# Patient Record
Sex: MALE | Race: WHITE | Employment: FULL TIME | URBAN - METROPOLITAN AREA
[De-identification: names, ages, dates, MRNs, and addresses within clinical notes are randomized per-mention and may not be internally consistent; named-entity substitution may affect disease eponyms.]

---

## 2018-07-24 ENCOUNTER — HOSPITAL ENCOUNTER (OUTPATIENT)
Dept: PHYSICAL THERAPY | Age: 58
Discharge: HOME | End: 2018-07-24
Attending: FAMILY MEDICINE
Payer: COMMERCIAL

## 2018-07-24 DIAGNOSIS — M77.8 SHOULDER TENDONITIS, RIGHT: ICD-10-CM

## 2018-07-24 PROCEDURE — 97162 PT EVAL MOD COMPLEX 30 MIN: CPT

## 2018-07-24 NOTE — THERAPY EVALUATION
Shannan Watkins  : 1960  Primary: [facility]*  Secondary:  [facility] [place] [address], [address] [facility]  [phone]   [phone]       OUTPATIENT PHYSICAL THERAPY:Initial Assessment and Daily Note 2018   ICD-10: Treatment Diagnosis: pain in joint, right shoulder M25.511  ICD-10: Treatment Diagnosis: shoulder tendonitis, right M75.81  ICD-10: Treatment Diagnosis: pain in thoracic spine M54.6  Precautions/Allergies:   Review of patient's allergies indicates no known allergies. Fall Risk Score: 1 (? 5 = High Risk)  MD Orders: evaluate and treat MEDICAL/REFERRING DIAGNOSIS:  Shoulder tendonitis, right [M75.81]   DATE OF ONSET: one year/chronic  REFERRING PHYSICIAN: Laura Abarca MD  RETURN PHYSICIAN APPOINTMENT: 2019     INITIAL ASSESSMENT:  Mr. Es Washington is a 59 y.o. male who presents to physical therapy with chronic right shoulder pain and limited ROM. He is challenged with shoulder flexion/elevation, external rotation and internal rotation. He presents with significant mechanical and neuromuscular dysfunctions of the right shoulder girdle and thoracic spine. He would benefit from skilled physical therapy to improve overall mobility in his right shoulder girdle, decrease soft tissue restrictions, improve imbalance of muscles and demonstrate pain free ROM with daily activities. PROBLEM LIST (Impacting functional limitations):  1. Decreased Strength  2. Decreased ADL/Functional Activities  3. Decreased Balance  4. Increased Pain  5. Decreased Activity Tolerance  6. Decreased Flexibility/Joint Mobility INTERVENTIONS PLANNED:  1. Home Exercise Program (HEP)  2. Manual Therapy  3. Neuromuscular Re-education/Strengthening  4. Range of Motion (ROM)  5. Therapeutic Activites  6. Therapeutic Exercise/Strengthening   TREATMENT PLAN:  Effective Dates: 2018 TO 10/22/2018 (90 days).   Frequency/Duration: 2 times a week for 90 Days  GOALS: (Goals have been discussed and agreed upon with patient.)  Discharge Goals: Time Frame: 90 days  1. Patient demonstrates independence with his HEP without verbal cueing from therapist.  2. Patient demonstrates full AROM of right shoulder girdle  3. Demonstrates correct sitting posture and desk set up and able to sit for 1 hour with correct posture techniques. 4. Patient able to perform 1 hour of activity with pain no more than 0-2/10 in right shoulder girdle. 5. Improve DASH outcome measure score from 25/55 to 15/55  Rehabilitation Potential For Stated Goals: Excellent  Regarding Pritesh Watkins's therapy, I certify that the treatment plan above will be carried out by a therapist or under their direction. Thank you for this referral,  Emeka Ball PT                 The information in this section was collected on 7/24/18 (except where otherwise noted). HISTORY:   History of Present Injury/Illness (Reason for Referral):  Chronic history of right shoulder pain, started with insidious onset about one year ago and discomfort and popping in shoulder has continued to progress. He is an avid weight  and works out 4-5x a week at [employer], notes he has been lifting weighs since the 80s. Notes he is able to do all activities when his arms are by his sides with no pain or irritation. When he attempts to lift up and overhead as well as reaching out and performing shoulder ER increased pain and discomfort is present. Patient denies dizziness, drop attacks, numbness/tingling, bowel/bladder dysfunction and/or unexplained weight gain/loss. Past Medical History/Comorbidities:   Mr. Cara Delvalle  has a past medical history of CAD (coronary artery disease) (1/24/2014); Cancer ([facility] [address] (06/19/2018); Cervicalgia; Pain in joint, ankle and foot; Pain in joint, shoulder region (1/24/2014);  Routine general medical examination at a health care facility; Screening for lipoid disorders; Special screening for malignant neoplasm of prostate; and Unspecified deformity of finger. Mr. Richi Turner  has a past surgical history that includes hx colonoscopy (2008) and hx heent (06/19/2018). Social History/Living Environment:     Lives in a private home with his wife, challenged with activities with reaching and overhead. Prior Level of Function/Work/Activity:  Works full time as a Technology , notes occasional discomfort in right shoulder, however most discomfort noted with his daily work outs at [employer], limited with how much weight and repetitions. He has been a weight  since the 80s and notes he has needed to significantly decrease the amount of weight he uses. Dominant Side:         RIGHT  Current Medications:       Current Outpatient Prescriptions:     CHARCOAL, by Does Not Apply route., Disp: , Rfl:     OTHER, Indications: Cholrophyll, Disp: , Rfl:     ibuprofen (MOTRIN) 200 mg tablet, Take  by mouth., Disp: , Rfl:     glucosamine (GLUCOSAMINE RELIEF) 1,000 mg tab, Take  by mouth., Disp: , Rfl:     aspirin delayed-release 81 mg tablet, Take  by mouth daily. , Disp: , Rfl:    Date Last Reviewed:  7/24/2018     Number of Personal Factors/Comorbidities that affect the Plan of Care: 1-2: MODERATE COMPLEXITY   EXAMINATION:   Observation/Orthostatic Postural Assessment:           Shoulder symmetry exhibits right elevated compared to left. Shoulder girdles are bilateral protracted, right greater than left. Scapular position is right elevated. Clavicles are right elevated. Soft tissue observations indicates restrictions in right greater than left upper trapezius, levator scapula, pectoralis, latissimus dorsi, subscapularis, infraspinatus and thoracic spine paraspinals. Patient exhibits a neutral cervical lordosis,  increased thoracic kyphosis with convex right type I curve in mid-thoracic spine, and neutral lumbar lordosis. Lower quadrant bony landmarks are left iliac crest elevated slightly. Palpation:  Myofascial assessment indicates restrictions in anterior chest. Muscle length testing levator scapulae with ipsilateral arm abduction is moderate restrictions right. Upper trapezius length is moderate restrictions bilaterally. Pectoralis minor/major and latissimus dorsi length is restricted right greater than left. Scalene muscle length is restricted right greater than left. Recent incision and scar tissue from melanoma removal on right lateral neck. ROM: Gross active cervical spine rotation is 80% available bilaterally. Apley's scratch test for functional ER/IR is right ER to mid-skull posteriorly, IR to iliac crest on right, left WFL. Passive Accessory Motion: Glenohumeral mobility is limited for right inferior glides. Acromioclavicular mobility is moderate restrictions right. Sternoclavicular mobility is moderate restrictions right. Scapulothoracic mobility is poor neuromuscular control of shoulder girdle position, challenged with posterior depression. AROM(PROM) in degrees Right Left   Shoulder flexion 0-140 0-160   Shoulder abduction (palm down) 0-80 0-100   Shoulder extension 0-10 0-20   Shoulder internal rotation (IR)  (measured at 45 degrees of abduction) 0-15 0-30   ER (measured at 90 degrees of abduction) 0-45 0-50     Strength:   Manual Muscle Test (*/5) Right Left   Shoulder flexion 4- 5   Shoulder extension 4 5   Shoulder abduction 4- 5   Shoulder adduction 4 5   Shoulder ER 4- 5   Shoulder IR 4- 5   Upper trapezius 5 5   Middle trapezius 4 4+   Lower trapezius 4 4+   Rhomboids 4 4+   Serratus Anterior 4 4+   Elbow flexion 5 5   Elbow extension 5 5        Special Tests:    Impingement tests performed on the right shoulder:  Lemons-Jose R test: negative. Neer test: negative. Stability tests performed on the right shoulder:  Sulcus sign: negative. Apprehension test: positive. Neurological Screen:  Myotomes: Key muscle strength testing for bilateral UE is intact.   Dermatomes: Sensation testing through bilateral UE for light touch is intact. Reflexes: Biceps (C5), brachioradialis (C6), and triceps (C7) are not tested today. Neural tension tests: Upper limb tension test A is negative for exacerbation of patient's symptoms. Functional Mobility:  Challenged with overhead activities, reaching forward, reaching behind his back. Body Structures Involved:  1. Bones  2. Joints  3. Muscles Body Functions Affected:  1. Sensory/Pain  2. Neuromusculoskeletal  3. Movement Related Activities and Participation Affected:  1. General Tasks and Demands  2. Mobility  3. Self Care  4. Community, Social and [place]   Number of elements (examined above) that affect the Plan of Care: 3: MODERATE COMPLEXITY   CLINICAL PRESENTATION:   Presentation: Evolving clinical presentation with changing clinical characteristics: MODERATE COMPLEXITY   CLINICAL DECISION MAKING:   Outcome Measure: Tool Used: Disabilities of the Arm, Shoulder and Hand (DASH) Questionnaire - Quick Version  Score:  Initial: 25/55  Most Recent: X/55 (Date: -- )   Interpretation of Score: The DASH is designed to measure the activities of daily living in person's with upper extremity dysfunction or pain. Each section is scored on a 1-5 scale, 5 representing the greatest disability. The scores of each section are added together for a total score of 55. Medical Necessity:   · Patient is expected to demonstrate progress in strength, range of motion, balance, coordination and functional technique to increase independence with ADLs, weight lifting techniques, use of his right shoulder girdle without discomfort. .  Reason for Services/Other Comments:  · Patient continues to require skilled intervention due to challenged with use of his right shoulder girdle with daily activities. .   Use of outcome tool(s) and clinical judgement create a POC that gives a: Questionable prediction of patient's progress: MODERATE COMPLEXITY TREATMENT:   (In addition to Assessment/Re-Assessment sessions the following treatments were rendered)  Pre-treatment Symptoms/Complaints:  Patient notes looking forward to therapy to learn correct exercises and figure out what is going on with his shoulder. Pain: Initial:   Pain Intensity 1: 4  Pain Location 1: Shoulder  Pain Orientation 1: Right  Post Session:  3/10     Therapeutic Exercise: (5 Minutes):  Exercises per grid below to improve mobility, strength, balance and coordination. Required minimal verbal and manual cues to promote proper body alignment, promote proper body posture, promote proper body mechanics and promote proper body breathing techniques. Progressed resistance, range, repetitions and complexity of movement as indicated. Date:  7/24/2018     Activity/Exercise Parameters   Review anatomy, prognosis and plan of care for physical therapy X 5 minutes                               Digital [employer] Portal  Treatment/Session Assessment:    · Response to Treatment:  Improved awareness of shoulder anatomy and understands plan of care for physical therapy. · Compliance with Program/Exercises: compliant all of the time. · Recommendations/Intent for next treatment session: \"Next visit will focus on advancements to more challenging activities\".   Total Treatment Duration: 60 minutes: 55 minutes evaluation and 5 minutes treatment  PT Patient Time In/Time Out  Time In: 0463  Time Out: 1530 Marley Hook, PT

## 2018-07-24 NOTE — PROGRESS NOTES
Ambulatory/Rehab Services H2 Model Falls Risk Assessment    Risk Factor Pts. ·   Confusion/Disorientation/Impulsivity  []    4 ·   Symptomatic Depression  []   2 ·   Altered Elimination  []   1 ·   Dizziness/Vertigo  []   1 ·   Gender (Male)  [x]   1 ·   Any administered antiepileptics (anticonvulsants):  []   2 ·   Any administered benzodiazepines:  []   1 ·   Visual Impairment (specify):  []   1 ·   Portable Oxygen Use  []   1 ·   Orthostatic ? BP  []   1 ·   History of Recent Falls (within 3 mos.)  []   5     Ability to Rise from Chair (choose one) Pts. ·   Ability to rise in a single movement  [x]   0 ·   Pushes up, successful in one attempt  []   1 ·   Multiple attempts, but successful  []   3 ·   Unable to rise without assistance  []   4   Total: (5 or greater = High Risk) 1     Falls Prevention Plan:   []                Physical Limitations to Exercise (specify):   []                Mobility Assistance Device (type):   []                Exercise/Equipment Adaptation (specify):    ©2010 [facility] of [address] Patent #[id number].  Federal Law prohibits the replication, distribution or use without written permission from [facility] [employer]

## 2018-07-26 ENCOUNTER — HOSPITAL ENCOUNTER (OUTPATIENT)
Dept: PHYSICAL THERAPY | Age: 58
Discharge: HOME | End: 2018-07-26
Attending: FAMILY MEDICINE
Payer: COMMERCIAL

## 2018-07-26 PROCEDURE — 97140 MANUAL THERAPY 1/> REGIONS: CPT

## 2018-07-26 PROCEDURE — 97110 THERAPEUTIC EXERCISES: CPT

## 2018-07-26 NOTE — PROGRESS NOTES
Juancarlos Renae  : 1960  Primary: Lamberto Campos [facility]*  Secondary:  [facility], [address] [facility]  [phone]   [phone]       OUTPATIENT PHYSICAL THERAPY:Daily Note 2018   ICD-10: Treatment Diagnosis: pain in joint, right shoulder M25.511  ICD-10: Treatment Diagnosis: shoulder tendonitis, right M75.81  ICD-10: Treatment Diagnosis: pain in thoracic spine M54.6  Precautions/Allergies:   Review of patient's allergies indicates no known allergies. Fall Risk Score: 1 (? 5 = High Risk)  MD Orders: evaluate and treat MEDICAL/REFERRING DIAGNOSIS:  Other shoulder lesions, right shoulder [M75.81]   DATE OF ONSET: one year/chronic  REFERRING PHYSICIAN: Oliva Linder MD  RETURN PHYSICIAN APPOINTMENT: 2019     INITIAL ASSESSMENT:  Mr. Harjeet Dugan is a 59 y.o. male who presents to physical therapy with chronic right shoulder pain and limited ROM. He is challenged with shoulder flexion/elevation, external rotation and internal rotation. He presents with significant mechanical and neuromuscular dysfunctions of the right shoulder girdle and thoracic spine. He would benefit from skilled physical therapy to improve overall mobility in his right shoulder girdle, decrease soft tissue restrictions, improve imbalance of muscles and demonstrate pain free ROM with daily activities. PROBLEM LIST (Impacting functional limitations):  1. Decreased Strength  2. Decreased ADL/Functional Activities  3. Decreased Balance  4. Increased Pain  5. Decreased Activity Tolerance  6. Decreased Flexibility/Joint Mobility INTERVENTIONS PLANNED:  1. Home Exercise Program (HEP)  2. Manual Therapy  3. Neuromuscular Re-education/Strengthening  4. Range of Motion (ROM)  5. Therapeutic Activites  6. Therapeutic Exercise/Strengthening   TREATMENT PLAN:  Effective Dates: 2018 TO 10/22/2018 (90 days).   Frequency/Duration: 2 times a week for 90 Days  GOALS: (Goals have been discussed and agreed upon with patient.)  Discharge Goals: Time Frame: 90 days  1. Patient demonstrates independence with his HEP without verbal cueing from therapist.  2. Patient demonstrates full AROM of right shoulder girdle  3. Demonstrates correct sitting posture and desk set up and able to sit for 1 hour with correct posture techniques. 4. Patient able to perform 1 hour of activity with pain no more than 0-2/10 in right shoulder girdle. 5. Improve DASH outcome measure score from 25/55 to 15/55  Rehabilitation Potential For Stated Goals: Excellent  Regarding Pritesh Watkins's therapy, I certify that the treatment plan above will be carried out by a therapist or under their direction. Thank you for this referral,  Sandor Maldonado PT                 The information in this section was collected on 7/24/18 (except where otherwise noted). HISTORY:   History of Present Injury/Illness (Reason for Referral):  Chronic history of right shoulder pain, started with insidious onset about one year ago and discomfort and popping in shoulder has continued to progress. He is an avid weight  and works out 4-5x a week at [employer], notes he has been lifting weighs since the 80s. Notes he is able to do all activities when his arms are by his sides with no pain or irritation. When he attempts to lift up and overhead as well as reaching out and performing shoulder ER increased pain and discomfort is present. Patient denies dizziness, drop attacks, numbness/tingling, bowel/bladder dysfunction and/or unexplained weight gain/loss. Past Medical History/Comorbidities:   Mr. Hernandez Ra  has a past medical history of CAD (coronary artery disease) (1/24/2014); Cancer ([facility] [address] (06/19/2018); Cervicalgia; Pain in joint, ankle and foot; Pain in joint, shoulder region (1/24/2014);  Routine general medical examination at a health care facility; Screening for lipoid disorders; Special screening for malignant neoplasm of prostate; and Unspecified deformity of finger. Mr. Barbara Palafox  has a past surgical history that includes hx colonoscopy (2008) and hx heent (06/19/2018). Social History/Living Environment:     Lives in a private home with his wife, challenged with activities with reaching and overhead. Prior Level of Function/Work/Activity:  Works full time as a Technology , notes occasional discomfort in right shoulder, however most discomfort noted with his daily work outs at [employer], limited with how much weight and repetitions. He has been a weight  since the 80s and notes he has needed to significantly decrease the amount of weight he uses. Dominant Side:         RIGHT  Current Medications:       Current Outpatient Prescriptions:     CHARCOAL, by Does Not Apply route., Disp: , Rfl:     OTHER, Indications: Cholrophyll, Disp: , Rfl:     ibuprofen (MOTRIN) 200 mg tablet, Take  by mouth., Disp: , Rfl:     glucosamine (GLUCOSAMINE RELIEF) 1,000 mg tab, Take  by mouth., Disp: , Rfl:     aspirin delayed-release 81 mg tablet, Take  by mouth daily. , Disp: , Rfl:    Date Last Reviewed:  7/26/2018     Number of Personal Factors/Comorbidities that affect the Plan of Care: 1-2: MODERATE COMPLEXITY   EXAMINATION:   Observation/Orthostatic Postural Assessment:           Shoulder symmetry exhibits right elevated compared to left. Shoulder girdles are bilateral protracted, right greater than left. Scapular position is right elevated. Clavicles are right elevated. Soft tissue observations indicates restrictions in right greater than left upper trapezius, levator scapula, pectoralis, latissimus dorsi, subscapularis, infraspinatus and thoracic spine paraspinals. Patient exhibits a neutral cervical lordosis,  increased thoracic kyphosis with convex right type I curve in mid-thoracic spine, and neutral lumbar lordosis. Lower quadrant bony landmarks are left iliac crest elevated slightly. Palpation:  Myofascial assessment indicates restrictions in anterior chest. Muscle length testing levator scapulae with ipsilateral arm abduction is moderate restrictions right. Upper trapezius length is moderate restrictions bilaterally. Pectoralis minor/major and latissimus dorsi length is restricted right greater than left. Scalene muscle length is restricted right greater than left. Recent incision and scar tissue from melanoma removal on right lateral neck. ROM: Gross active cervical spine rotation is 80% available bilaterally. Apley's scratch test for functional ER/IR is right ER to mid-skull posteriorly, IR to iliac crest on right, left WFL. Passive Accessory Motion: Glenohumeral mobility is limited for right inferior glides. Acromioclavicular mobility is moderate restrictions right. Sternoclavicular mobility is moderate restrictions right. Scapulothoracic mobility is poor neuromuscular control of shoulder girdle position, challenged with posterior depression. AROM(PROM) in degrees Right Left   Shoulder flexion 0-140 0-160   Shoulder abduction (palm down) 0-80 0-100   Shoulder extension 0-10 0-20   Shoulder internal rotation (IR)  (measured at 45 degrees of abduction) 0-15 0-30   ER (measured at 90 degrees of abduction) 0-45 0-50     Strength:   Manual Muscle Test (*/5) Right Left   Shoulder flexion 4- 5   Shoulder extension 4 5   Shoulder abduction 4- 5   Shoulder adduction 4 5   Shoulder ER 4- 5   Shoulder IR 4- 5   Upper trapezius 5 5   Middle trapezius 4 4+   Lower trapezius 4 4+   Rhomboids 4 4+   Serratus Anterior 4 4+   Elbow flexion 5 5   Elbow extension 5 5        Special Tests:    Impingement tests performed on the right shoulder:  Lemons-Jose R test: negative. Neer test: negative. Stability tests performed on the right shoulder:  Sulcus sign: negative. Apprehension test: positive. Neurological Screen:  Myotomes: Key muscle strength testing for bilateral UE is intact.   Dermatomes: Sensation testing through bilateral UE for light touch is intact. Reflexes: Biceps (C5), brachioradialis (C6), and triceps (C7) are not tested today. Neural tension tests: Upper limb tension test A is negative for exacerbation of patient's symptoms. Functional Mobility:  Challenged with overhead activities, reaching forward, reaching behind his back. Body Structures Involved:  1. Bones  2. Joints  3. Muscles Body Functions Affected:  1. Sensory/Pain  2. Neuromusculoskeletal  3. Movement Related Activities and Participation Affected:  1. General Tasks and Demands  2. Mobility  3. Self Care  4. Community, Social and [place]   Number of elements (examined above) that affect the Plan of Care: 3: MODERATE COMPLEXITY   CLINICAL PRESENTATION:   Presentation: Evolving clinical presentation with changing clinical characteristics: MODERATE COMPLEXITY   CLINICAL DECISION MAKING:   Outcome Measure: Tool Used: Disabilities of the Arm, Shoulder and Hand (DASH) Questionnaire - Quick Version  Score:  Initial: 25/55  Most Recent: X/55 (Date: -- )   Interpretation of Score: The DASH is designed to measure the activities of daily living in person's with upper extremity dysfunction or pain. Each section is scored on a 1-5 scale, 5 representing the greatest disability. The scores of each section are added together for a total score of 55. Medical Necessity:   · Patient is expected to demonstrate progress in strength, range of motion, balance, coordination and functional technique to increase independence with ADLs, weight lifting techniques, use of his right shoulder girdle without discomfort. .  Reason for Services/Other Comments:  · Patient continues to require skilled intervention due to challenged with use of his right shoulder girdle with daily activities. .   Use of outcome tool(s) and clinical judgement create a POC that gives a: Questionable prediction of patient's progress: MODERATE COMPLEXITY TREATMENT:   (In addition to Assessment/Re-Assessment sessions the following treatments were rendered)  Pre-treatment Symptoms/Complaints:  Patient notes trying to be more aware of his posture, notes a difference already in his shoulder pain, less sharp pain. .   Pain: Initial:   Pain Intensity 1: 3  Pain Location 1: Shoulder  Pain Orientation 1: Right  Post Session:  3/10     Therapeutic Exercise: (25 Minutes):  Exercises per grid below to improve mobility, strength, balance and coordination. Required minimal verbal and manual cues to promote proper body alignment, promote proper body posture, promote proper body mechanics and promote proper body breathing techniques. Progressed resistance, range, repetitions and complexity of movement as indicated. Date:  7/26/2018     Activity/Exercise Parameters   Review posture X 5 minutes, patient tends to present with elevated sternum/thoracic spine extension posture   Supine serratus punch X 15 reps, 10 sec holds RUE  X 2 minute hold   Supine abdominal brace X 5 minute education core activation   x 5 reps, 30 sec holds   Patient education X 5 minute education of phasic and tonic spread, weight training techniques and review of current exercises at gym. Manual Therapy (    Soft Tissue Mobilization Duration  Duration: 35 Minutes): Manual techniques to facilitate improved motion and decreased pain.  (Used abbreviations: MET - muscle energy technique; PNF - proprioceptive neuromuscular facilitation; NMR - neuromuscular re-education; a/p - anterior to posterior; p/a - posterior to anterior, FMP - functional movement pattern)   · Supine anterior chest soft tissue mobilization with breathing techniques  · Supine right pectoralis major/minor soft tissue mobilization  · Supine right subscapularis and latissimus dorsi soft tissue mobilization with FMP of shoulder elevation      Med[employer] Portal  Treatment/Session Assessment:    · Response to Treatment: Challenged with core activation and continues to present with hyper extended posture. Fatigued easily with serratus anterior activation. · Compliance with Program/Exercises: compliant all of the time. · Recommendations/Intent for next treatment session: \"Next visit will focus on advancements to more challenging activities\".   Total Treatment Duration: 60 minutes  PT Patient Time In/Time Out  Time In: 4523  Time Out: Dominic Hardy, PT

## 2018-08-01 ENCOUNTER — HOSPITAL ENCOUNTER (OUTPATIENT)
Dept: PHYSICAL THERAPY | Age: 58
Discharge: HOME | End: 2018-08-01
Attending: FAMILY MEDICINE
Payer: COMMERCIAL

## 2018-08-01 PROCEDURE — 97140 MANUAL THERAPY 1/> REGIONS: CPT

## 2018-08-01 PROCEDURE — 97110 THERAPEUTIC EXERCISES: CPT

## 2018-08-01 NOTE — PROGRESS NOTES
Chaz Watkins  : 1960  Primary: [facility]  Secondary:  Therapy Center at [address]  [phone]   [phone]       OUTPATIENT PHYSICAL THERAPY:Daily Note 2018   ICD-10: Treatment Diagnosis: pain in joint, right shoulder M25.511  ICD-10: Treatment Diagnosis: shoulder tendonitis, right M75.81  ICD-10: Treatment Diagnosis: pain in thoracic spine M54.6  Precautions/Allergies:   Review of patient's allergies indicates no known allergies. Fall Risk Score: 1 (? 5 = High Risk)  MD Orders: evaluate and treat MEDICAL/REFERRING DIAGNOSIS:  Other shoulder lesions, right shoulder [M75.81]   DATE OF ONSET: one year/chronic  REFERRING PHYSICIAN: Ivan Mcqueen MD  RETURN PHYSICIAN APPOINTMENT: 2019     INITIAL ASSESSMENT:  Mr. Barbara Palafox is a 59 y.o. male who presents to physical therapy with chronic right shoulder pain and limited ROM. He is challenged with shoulder flexion/elevation, external rotation and internal rotation. He presents with significant mechanical and neuromuscular dysfunctions of the right shoulder girdle and thoracic spine. He would benefit from skilled physical therapy to improve overall mobility in his right shoulder girdle, decrease soft tissue restrictions, improve imbalance of muscles and demonstrate pain free ROM with daily activities. PROBLEM LIST (Impacting functional limitations):  1. Decreased Strength  2. Decreased ADL/Functional Activities  3. Decreased Balance  4. Increased Pain  5. Decreased Activity Tolerance  6. Decreased Flexibility/Joint Mobility INTERVENTIONS PLANNED:  1. Home Exercise Program (HEP)  2. Manual Therapy  3. Neuromuscular Re-education/Strengthening  4. Range of Motion (ROM)  5. Therapeutic Activites  6. Therapeutic Exercise/Strengthening   TREATMENT PLAN:  Effective Dates: 2018 TO 10/22/2018 (90 days).   Frequency/Duration: 2 times a week for 90 Days  GOALS: (Goals have been discussed and agreed upon with patient.)  Discharge Goals: Time Frame: 90 days  1. Patient demonstrates independence with his HEP without verbal cueing from therapist.  2. Patient demonstrates full AROM of right shoulder girdle  3. Demonstrates correct sitting posture and desk set up and able to sit for 1 hour with correct posture techniques. 4. Patient able to perform 1 hour of activity with pain no more than 0-2/10 in right shoulder girdle. 5. Improve DASH outcome measure score from 25/55 to 15/55  Rehabilitation Potential For Stated Goals: Excellent  Regarding Pritesh Watkins's therapy, I certify that the treatment plan above will be carried out by a therapist or under their direction. Thank you for this referral,  Libra Brunson PT                 The information in this section was collected on 7/24/18 (except where otherwise noted). HISTORY:   History of Present Injury/Illness (Reason for Referral):  Chronic history of right shoulder pain, started with insidious onset about one year ago and discomfort and popping in shoulder has continued to progress. He is an avid weight  and works out 4-5x a week at [employer], notes he has been lifting weighs since the 80s. Notes he is able to do all activities when his arms are by his sides with no pain or irritation. When he attempts to lift up and overhead as well as reaching out and performing shoulder ER increased pain and discomfort is present. Patient denies dizziness, drop attacks, numbness/tingling, bowel/bladder dysfunction and/or unexplained weight gain/loss. Past Medical History/Comorbidities:   Mr. Harjeet Dugan  has a past medical history of CAD (coronary artery disease) (1/24/2014); Cancer ([facility] [address] (06/19/2018); Cervicalgia; Pain in joint, ankle and foot; Pain in joint, shoulder region (1/24/2014);  Routine general medical examination at a health care facility; Screening for lipoid disorders; Special screening for malignant neoplasm of prostate; and Unspecified deformity of finger. Mr. Melissa Priest  has a past surgical history that includes hx colonoscopy (2008) and hx heent (06/19/2018). Social History/Living Environment:     Lives in a private home with his wife, challenged with activities with reaching and overhead. Prior Level of Function/Work/Activity:  Works full time as a Technology , notes occasional discomfort in right shoulder, however most discomfort noted with his daily work outs at [employer], limited with how much weight and repetitions. He has been a weight  since the 80s and notes he has needed to significantly decrease the amount of weight he uses. Dominant Side:         RIGHT  Current Medications:       Current Outpatient Prescriptions:     CHARCOAL, by Does Not Apply route., Disp: , Rfl:     OTHER, Indications: Cholrophyll, Disp: , Rfl:     ibuprofen (MOTRIN) 200 mg tablet, Take  by mouth., Disp: , Rfl:     glucosamine (GLUCOSAMINE RELIEF) 1,000 mg tab, Take  by mouth., Disp: , Rfl:     aspirin delayed-release 81 mg tablet, Take  by mouth daily. , Disp: , Rfl:    Date Last Reviewed:  8/1/2018     Number of Personal Factors/Comorbidities that affect the Plan of Care: 1-2: MODERATE COMPLEXITY   EXAMINATION:   Observation/Orthostatic Postural Assessment:           Shoulder symmetry exhibits right elevated compared to left. Shoulder girdles are bilateral protracted, right greater than left. Scapular position is right elevated. Clavicles are right elevated. Soft tissue observations indicates restrictions in right greater than left upper trapezius, levator scapula, pectoralis, latissimus dorsi, subscapularis, infraspinatus and thoracic spine paraspinals. Patient exhibits a neutral cervical lordosis,  increased thoracic kyphosis with convex right type I curve in mid-thoracic spine, and neutral lumbar lordosis. Lower quadrant bony landmarks are left iliac crest elevated slightly.     Palpation: Myofascial assessment indicates restrictions in anterior chest. Muscle length testing levator scapulae with ipsilateral arm abduction is moderate restrictions right. Upper trapezius length is moderate restrictions bilaterally. Pectoralis minor/major and latissimus dorsi length is restricted right greater than left. Scalene muscle length is restricted right greater than left. Recent incision and scar tissue from melanoma removal on right lateral neck. ROM: Gross active cervical spine rotation is 80% available bilaterally. Apley's scratch test for functional ER/IR is right ER to mid-skull posteriorly, IR to iliac crest on right, left WFL. Passive Accessory Motion: Glenohumeral mobility is limited for right inferior glides. Acromioclavicular mobility is moderate restrictions right. Sternoclavicular mobility is moderate restrictions right. Scapulothoracic mobility is poor neuromuscular control of shoulder girdle position, challenged with posterior depression. AROM(PROM) in degrees Right Left   Shoulder flexion 0-140 0-160   Shoulder abduction (palm down) 0-80 0-100   Shoulder extension 0-10 0-20   Shoulder internal rotation (IR)  (measured at 45 degrees of abduction) 0-15 0-30   ER (measured at 90 degrees of abduction) 0-45 0-50     Strength:   Manual Muscle Test (*/5) Right Left   Shoulder flexion 4- 5   Shoulder extension 4 5   Shoulder abduction 4- 5   Shoulder adduction 4 5   Shoulder ER 4- 5   Shoulder IR 4- 5   Upper trapezius 5 5   Middle trapezius 4 4+   Lower trapezius 4 4+   Rhomboids 4 4+   Serratus Anterior 4 4+   Elbow flexion 5 5   Elbow extension 5 5        Special Tests:    Impingement tests performed on the right shoulder:  Lemons-Jose R test: negative. Neer test: negative. Stability tests performed on the right shoulder:  Sulcus sign: negative. Apprehension test: positive. Neurological Screen:  Myotomes: Key muscle strength testing for bilateral UE is intact.   Dermatomes: Sensation testing through bilateral UE for light touch is intact. Reflexes: Biceps (C5), brachioradialis (C6), and triceps (C7) are not tested today. Neural tension tests: Upper limb tension test A is negative for exacerbation of patient's symptoms. Functional Mobility:  Challenged with overhead activities, reaching forward, reaching behind his back. Body Structures Involved:  1. Bones  2. Joints  3. Muscles Body Functions Affected:  1. Sensory/Pain  2. Neuromusculoskeletal  3. Movement Related Activities and Participation Affected:  1. General Tasks and Demands  2. Mobility  3. Self Care  4. Community, Social and [place]   Number of elements (examined above) that affect the Plan of Care: 3: MODERATE COMPLEXITY   CLINICAL PRESENTATION:   Presentation: Evolving clinical presentation with changing clinical characteristics: MODERATE COMPLEXITY   CLINICAL DECISION MAKING:   Outcome Measure: Tool Used: Disabilities of the Arm, Shoulder and Hand (DASH) Questionnaire - Quick Version  Score:  Initial: 25/55  Most Recent: X/55 (Date: -- )   Interpretation of Score: The DASH is designed to measure the activities of daily living in person's with upper extremity dysfunction or pain. Each section is scored on a 1-5 scale, 5 representing the greatest disability. The scores of each section are added together for a total score of 55. Medical Necessity:   · Patient is expected to demonstrate progress in strength, range of motion, balance, coordination and functional technique to increase independence with ADLs, weight lifting techniques, use of his right shoulder girdle without discomfort. .  Reason for Services/Other Comments:  · Patient continues to require skilled intervention due to challenged with use of his right shoulder girdle with daily activities. .   Use of outcome tool(s) and clinical judgement create a POC that gives a: Questionable prediction of patient's progress: MODERATE COMPLEXITY            TREATMENT: (In addition to Assessment/Re-Assessment sessions the following treatments were rendered)  Pre-treatment Symptoms/Complaints:  Patient notes less pain with daily movements in his right shoulder, however continues to have stiffness especially after stationary positions. Pain: Initial:   Pain Intensity 1: 3  Pain Location 1: Shoulder  Pain Orientation 1: Right  Post Session:  3/10     Therapeutic Exercise: (30 Minutes):  Exercises per grid below to improve mobility, strength, balance and coordination. Required minimal verbal and manual cues to promote proper body alignment, promote proper body posture, promote proper body mechanics and promote proper body breathing techniques. Progressed resistance, range, repetitions and complexity of movement as indicated. Date:  8/1/2018     Activity/Exercise Parameters   Review posture X 5 minutes, patient tends to present with elevated sternum/thoracic spine extension posture   Supine serratus punch X 15 reps, 10 sec holds RUE  X 2 minute hold   Supine abdominal brace  x 5 reps, 30 sec holds   Patient education     Seated scapular retraction X 5 minute education of postural positions   X 10 reps 10 sec holds  X 10 reps, 10 sec  Holds with green band   Seated shoulder elevation with inferior glides X 10 reps, green t-band   Supine shoulder ER X 15 reps, red t-band at 90 degrees abduction     Manual Therapy (    Soft Tissue Mobilization Duration  Duration: 30 Minutes): Manual techniques to facilitate improved motion and decreased pain.  (Used abbreviations: MET - muscle energy technique; PNF - proprioceptive neuromuscular facilitation; NMR - neuromuscular re-education; a/p - anterior to posterior; p/a - posterior to anterior, FMP - functional movement pattern)   · Supine anterior chest soft tissue mobilization with breathing techniques  · Supine right pectoralis major/minor soft tissue mobilization  · Supine right subscapularis and latissimus dorsi soft tissue mobilization with FMP of shoulder elevation  · Supine right GH joint mobilizations, inferior glides  · Supine right GH joint PROM into ER at 90 degrees abduction, followed with prolonged holds and NMR       MedBridge Portal  Treatment/Session Assessment:    · Response to Treatment:  Challenged with posture and prolonged holds with exercises, inhibition noted through right shoulder with shoulder ER exercises . · Compliance with Program/Exercises: compliant all of the time. · Recommendations/Intent for next treatment session: \"Next visit will focus on advancements to more challenging activities\".   Total Treatment Duration: 60 minutes  PT Patient Time In/Time Out  Time In: 7010  Time Out: 0862 [address] [place]

## 2018-08-06 ENCOUNTER — HOSPITAL ENCOUNTER (OUTPATIENT)
Dept: PHYSICAL THERAPY | Age: 58
Discharge: HOME | End: 2018-08-06
Attending: FAMILY MEDICINE
Payer: COMMERCIAL

## 2018-08-06 NOTE — PROGRESS NOTES
Trudy Snede   (YTW:[id number]) [address]  at  [place] [address] [address]  [phone]  [phone]             DATE: 8/6/2018    Patient canceled for appointment today due to work conflict. Will plan to follow up on next scheduled visit.     Scot Cleaning PT, DPT, CFMT

## 2018-08-08 ENCOUNTER — HOSPITAL ENCOUNTER (OUTPATIENT)
Dept: PHYSICAL THERAPY | Age: 58
Discharge: HOME | End: 2018-08-08
Attending: FAMILY MEDICINE
Payer: COMMERCIAL

## 2018-08-08 PROCEDURE — 97110 THERAPEUTIC EXERCISES: CPT

## 2018-08-08 PROCEDURE — 97140 MANUAL THERAPY 1/> REGIONS: CPT

## 2018-08-08 NOTE — PROGRESS NOTES
Harvinder Watkins  : 1960  Primary: [facility]  Secondary:  [facility] at [address]  [phone]   [phone]       OUTPATIENT PHYSICAL THERAPY:Daily Note 2018   ICD-10: Treatment Diagnosis: pain in joint, right shoulder M25.511  ICD-10: Treatment Diagnosis: shoulder tendonitis, right M75.81  ICD-10: Treatment Diagnosis: pain in thoracic spine M54.6  Precautions/Allergies:   Review of patient's allergies indicates no known allergies. Fall Risk Score: 1 (? 5 = High Risk)  MD Orders: evaluate and treat MEDICAL/REFERRING DIAGNOSIS:  Other shoulder lesions, right shoulder [M75.81]   DATE OF ONSET: one year/chronic  REFERRING PHYSICIAN: Linda Moreno MD  RETURN PHYSICIAN APPOINTMENT: 2019     INITIAL ASSESSMENT:  Mr. Alfredo Gallegos is a 59 y.o. male who presents to physical therapy with chronic right shoulder pain and limited ROM. He is challenged with shoulder flexion/elevation, external rotation and internal rotation. He presents with significant mechanical and neuromuscular dysfunctions of the right shoulder girdle and thoracic spine. He would benefit from skilled physical therapy to improve overall mobility in his right shoulder girdle, decrease soft tissue restrictions, improve imbalance of muscles and demonstrate pain free ROM with daily activities. PROBLEM LIST (Impacting functional limitations):  1. Decreased Strength  2. Decreased ADL/Functional Activities  3. Decreased Balance  4. Increased Pain  5. Decreased Activity Tolerance  6. Decreased Flexibility/Joint Mobility INTERVENTIONS PLANNED:  1. Home Exercise Program (HEP)  2. Manual Therapy  3. Neuromuscular Re-education/Strengthening  4. Range of Motion (ROM)  5. Therapeutic Activites  6. Therapeutic Exercise/Strengthening   TREATMENT PLAN:  Effective Dates: 2018 TO 10/22/2018 (90 days).   Frequency/Duration: 2 times a week for 90 Days  GOALS: (Goals have been discussed and agreed upon with patient.)  Discharge Goals: Time Frame: 90 days  1. Patient demonstrates independence with his HEP without verbal cueing from therapist.  2. Patient demonstrates full AROM of right shoulder girdle  3. Demonstrates correct sitting posture and desk set up and able to sit for 1 hour with correct posture techniques. 4. Patient able to perform 1 hour of activity with pain no more than 0-2/10 in right shoulder girdle. 5. Improve DASH outcome measure score from 25/55 to 15/55  Rehabilitation Potential For Stated Goals: Excellent  Regarding Pritesh Watkins's therapy, I certify that the treatment plan above will be carried out by a therapist or under their direction. Thank you for this referral,  Indy Saldana PT                 The information in this section was collected on 7/24/18 (except where otherwise noted). HISTORY:   History of Present Injury/Illness (Reason for Referral):  Chronic history of right shoulder pain, started with insidious onset about one year ago and discomfort and popping in shoulder has continued to progress. He is an avid weight  and works out 4-5x a week at [employer], notes he has been lifting weighs since the 80s. Notes he is able to do all activities when his arms are by his sides with no pain or irritation. When he attempts to lift up and overhead as well as reaching out and performing shoulder ER increased pain and discomfort is present. Patient denies dizziness, drop attacks, numbness/tingling, bowel/bladder dysfunction and/or unexplained weight gain/loss. Past Medical History/Comorbidities:   Mr. Berta Moe  has a past medical history of CAD (coronary artery disease) (1/24/2014); Cancer ([facility] [address] (06/19/2018); Cervicalgia; Pain in joint, ankle and foot; Pain in joint, shoulder region (1/24/2014);  Routine general medical examination at a health care facility; Screening for lipoid disorders; Special screening for malignant neoplasm of prostate; and Unspecified deformity of finger. Mr. Harjeet Dugan  has a past surgical history that includes hx colonoscopy (2008) and hx heent (06/19/2018). Social History/Living Environment:     Lives in a private home with his wife, challenged with activities with reaching and overhead. Prior Level of Function/Work/Activity:  Works full time as a Technology , notes occasional discomfort in right shoulder, however most discomfort noted with his daily work outs at [employer], limited with how much weight and repetitions. He has been a weight  since the 80s and notes he has needed to significantly decrease the amount of weight he uses. Dominant Side:         RIGHT  Current Medications:       Current Outpatient Prescriptions:     CHARCOAL, by Does Not Apply route., Disp: , Rfl:     OTHER, Indications: Cholrophyll, Disp: , Rfl:     ibuprofen (MOTRIN) 200 mg tablet, Take  by mouth., Disp: , Rfl:     glucosamine (GLUCOSAMINE RELIEF) 1,000 mg tab, Take  by mouth., Disp: , Rfl:     aspirin delayed-release 81 mg tablet, Take  by mouth daily. , Disp: , Rfl:    Date Last Reviewed:  8/8/2018     Number of Personal Factors/Comorbidities that affect the Plan of Care: 1-2: MODERATE COMPLEXITY   EXAMINATION:   Observation/Orthostatic Postural Assessment:           Shoulder symmetry exhibits right elevated compared to left. Shoulder girdles are bilateral protracted, right greater than left. Scapular position is right elevated. Clavicles are right elevated. Soft tissue observations indicates restrictions in right greater than left upper trapezius, levator scapula, pectoralis, latissimus dorsi, subscapularis, infraspinatus and thoracic spine paraspinals. Patient exhibits a neutral cervical lordosis,  increased thoracic kyphosis with convex right type I curve in mid-thoracic spine, and neutral lumbar lordosis. Lower quadrant bony landmarks are left iliac crest elevated slightly.     Palpation: Myofascial assessment indicates restrictions in anterior chest. Muscle length testing levator scapulae with ipsilateral arm abduction is moderate restrictions right. Upper trapezius length is moderate restrictions bilaterally. Pectoralis minor/major and latissimus dorsi length is restricted right greater than left. Scalene muscle length is restricted right greater than left. Recent incision and scar tissue from melanoma removal on right lateral neck. ROM: Gross active cervical spine rotation is 80% available bilaterally. Apley's scratch test for functional ER/IR is right ER to mid-skull posteriorly, IR to iliac crest on right, left WFL. Passive Accessory Motion: Glenohumeral mobility is limited for right inferior glides. Acromioclavicular mobility is moderate restrictions right. Sternoclavicular mobility is moderate restrictions right. Scapulothoracic mobility is poor neuromuscular control of shoulder girdle position, challenged with posterior depression. AROM(PROM) in degrees Right Left   Shoulder flexion 0-140 0-160   Shoulder abduction (palm down) 0-80 0-100   Shoulder extension 0-10 0-20   Shoulder internal rotation (IR)  (measured at 45 degrees of abduction) 0-15 0-30   ER (measured at 90 degrees of abduction) 0-45 0-50     Strength:   Manual Muscle Test (*/5) Right Left   Shoulder flexion 4- 5   Shoulder extension 4 5   Shoulder abduction 4- 5   Shoulder adduction 4 5   Shoulder ER 4- 5   Shoulder IR 4- 5   Upper trapezius 5 5   Middle trapezius 4 4+   Lower trapezius 4 4+   Rhomboids 4 4+   Serratus Anterior 4 4+   Elbow flexion 5 5   Elbow extension 5 5        Special Tests:    Impingement tests performed on the right shoulder:  Lemons-Jose R test: negative. Neer test: negative. Stability tests performed on the right shoulder:  Sulcus sign: negative. Apprehension test: positive. Neurological Screen:  Myotomes: Key muscle strength testing for bilateral UE is intact.   Dermatomes: Sensation testing through bilateral UE for light touch is intact. Reflexes: Biceps (C5), brachioradialis (C6), and triceps (C7) are not tested today. Neural tension tests: Upper limb tension test A is negative for exacerbation of patient's symptoms. Functional Mobility:  Challenged with overhead activities, reaching forward, reaching behind his back. Body Structures Involved:  1. Bones  2. Joints  3. Muscles Body Functions Affected:  1. Sensory/Pain  2. Neuromusculoskeletal  3. Movement Related Activities and Participation Affected:  1. General Tasks and Demands  2. Mobility  3. Self Care  4. Community, Social and [place]   Number of elements (examined above) that affect the Plan of Care: 3: MODERATE COMPLEXITY   CLINICAL PRESENTATION:   Presentation: Evolving clinical presentation with changing clinical characteristics: MODERATE COMPLEXITY   CLINICAL DECISION MAKING:   Outcome Measure: Tool Used: Disabilities of the Arm, Shoulder and Hand (DASH) Questionnaire - Quick Version  Score:  Initial: 25/55  Most Recent: X/55 (Date: -- )   Interpretation of Score: The DASH is designed to measure the activities of daily living in person's with upper extremity dysfunction or pain. Each section is scored on a 1-5 scale, 5 representing the greatest disability. The scores of each section are added together for a total score of 55. Medical Necessity:   · Patient is expected to demonstrate progress in strength, range of motion, balance, coordination and functional technique to increase independence with ADLs, weight lifting techniques, use of his right shoulder girdle without discomfort. .  Reason for Services/Other Comments:  · Patient continues to require skilled intervention due to challenged with use of his right shoulder girdle with daily activities. .   Use of outcome tool(s) and clinical judgement create a POC that gives a: Questionable prediction of patient's progress: MODERATE COMPLEXITY            TREATMENT: (In addition to Assessment/Re-Assessment sessions the following treatments were rendered)  Pre-treatment Symptoms/Complaints:  Patient notes increased pain in right shoulder after Monday's work day, noted he was lifting, reaching and using his RUE more and increased irritation noted since then. Pain: Initial:   Pain Intensity 1: 4  Pain Location 1: Shoulder  Pain Orientation 1: Right  Post Session:  2/10     Therapeutic Exercise: (30 Minutes):  Exercises per grid below to improve mobility, strength, balance and coordination. Required minimal verbal and manual cues to promote proper body alignment, promote proper body posture, promote proper body mechanics and promote proper body breathing techniques. Progressed resistance, range, repetitions and complexity of movement as indicated. Date:  8/8/2018     Activity/Exercise Parameters   Review posture X 5 minutes, patient tends to present with elevated sternum/thoracic spine extension posture   Supine serratus punch X 15 reps, 10 sec holds RUE  X 2 minute hold   Supine abdominal brace  x 5 reps, 30 sec holds   Patient education     Seated scapular retraction  X 10 reps 10 sec holds  X 10 reps, 10 sec  Holds with green band   Seated shoulder elevation with inferior glides X 10 reps, green t-band   Supine shoulder ER X 15 reps, red t-band at 90 degrees abduction   Side lying shoulder arm swings X 10 reps clockwise, counter clockwise   Prone T, I X 15 reps, 5 sec  holds         Manual Therapy (    Soft Tissue Mobilization Duration  Duration: 30 Minutes): Manual techniques to facilitate improved motion and decreased pain.  (Used abbreviations: MET - muscle energy technique; PNF - proprioceptive neuromuscular facilitation; NMR - neuromuscular re-education; a/p - anterior to posterior; p/a - posterior to anterior, FMP - functional movement pattern)   · Supine anterior chest soft tissue mobilization with breathing techniques  · Supine right pectoralis major/minor soft tissue mobilization  · Supine right subscapularis and latissimus dorsi soft tissue mobilization with FMP of shoulder elevation  · Supine right GH joint mobilizations, inferior glides  · Supine right GH joint PROM into ER at 90 degrees abduction, followed with prolonged holds and NMR   · Side lying left for right PNF scapular patterns with prolonged holds into posterior depression. [facility] Portal  Treatment/Session Assessment:    · Response to Treatment:  Improving overall mobility in right shoulder, fatigued easily with postural stabilizer exercises. · Compliance with Program/Exercises: compliant all of the time. · Recommendations/Intent for next treatment session: \"Next visit will focus on advancements to more challenging activities\".   Total Treatment Duration: 60 minutes  PT Patient Time In/Time Out  Time In: 8501  Time Out: 4682 [address] Claudette List, PT

## 2018-08-15 ENCOUNTER — HOSPITAL ENCOUNTER (OUTPATIENT)
Dept: PHYSICAL THERAPY | Age: 58
Discharge: HOME | End: 2018-08-15
Attending: FAMILY MEDICINE
Payer: COMMERCIAL

## 2018-08-15 PROCEDURE — 97110 THERAPEUTIC EXERCISES: CPT

## 2018-08-15 PROCEDURE — 97140 MANUAL THERAPY 1/> REGIONS: CPT

## 2018-08-15 NOTE — PROGRESS NOTES
Nan Sky  : 1960  Primary: [facility]  Secondary:  [facility] at [address], [address] [place] [address]  [phone]   [phone]       OUTPATIENT PHYSICAL THERAPY:Daily Note 8/15/2018   ICD-10: Treatment Diagnosis: pain in joint, right shoulder M25.511  ICD-10: Treatment Diagnosis: shoulder tendonitis, right M75.81  ICD-10: Treatment Diagnosis: pain in thoracic spine M54.6  Precautions/Allergies:   Review of patient's allergies indicates no known allergies. Fall Risk Score: 1 (? 5 = High Risk)  MD Orders: evaluate and treat MEDICAL/REFERRING DIAGNOSIS:  Other shoulder lesions, right shoulder [M75.81]   DATE OF ONSET: one year/chronic  REFERRING PHYSICIAN: Rich Fernandes MD  RETURN PHYSICIAN APPOINTMENT: 2019     INITIAL ASSESSMENT:  Mr. Maria T Wiley is a 59 y.o. male who presents to physical therapy with chronic right shoulder pain and limited ROM. He is challenged with shoulder flexion/elevation, external rotation and internal rotation. He presents with significant mechanical and neuromuscular dysfunctions of the right shoulder girdle and thoracic spine. He would benefit from skilled physical therapy to improve overall mobility in his right shoulder girdle, decrease soft tissue restrictions, improve imbalance of muscles and demonstrate pain free ROM with daily activities. PROBLEM LIST (Impacting functional limitations):  1. Decreased Strength  2. Decreased ADL/Functional Activities  3. Decreased Balance  4. Increased Pain  5. Decreased Activity Tolerance  6. Decreased Flexibility/Joint Mobility INTERVENTIONS PLANNED:  1. Home Exercise Program (HEP)  2. Manual Therapy  3. Neuromuscular Re-education/Strengthening  4. Range of Motion (ROM)  5. Therapeutic Activites  6. Therapeutic Exercise/Strengthening   TREATMENT PLAN:  Effective Dates: 2018 TO 10/22/2018 (90 days).   Frequency/Duration: 2 times a week for 90 Days  GOALS: (Goals have been discussed and agreed upon with patient.)  Discharge Goals: Time Frame: 90 days  1. Patient demonstrates independence with his HEP without verbal cueing from therapist.  2. Patient demonstrates full AROM of right shoulder girdle  3. Demonstrates correct sitting posture and desk set up and able to sit for 1 hour with correct posture techniques. 4. Patient able to perform 1 hour of activity with pain no more than 0-2/10 in right shoulder girdle. 5. Improve DASH outcome measure score from 25/55 to 15/55  Rehabilitation Potential For Stated Goals: Excellent  Regarding Pritesh [place]'s therapy, I certify that the treatment plan above will be carried out by a therapist or under their direction. Thank you for this referral,  Kevin Anand PT                 The information in this section was collected on 7/24/18 (except where otherwise noted). HISTORY:   History of Present Injury/Illness (Reason for Referral):  Chronic history of right shoulder pain, started with insidious onset about one year ago and discomfort and popping in shoulder has continued to progress. He is an avid weight  and works out 4-5x a week at [employer], notes he has been lifting weighs since the 80s. Notes he is able to do all activities when his arms are by his sides with no pain or irritation. When he attempts to lift up and overhead as well as reaching out and performing shoulder ER increased pain and discomfort is present. Patient denies dizziness, drop attacks, numbness/tingling, bowel/bladder dysfunction and/or unexplained weight gain/loss. Past Medical History/Comorbidities:   Mr. Maria T Wiley  has a past medical history of CAD (coronary artery disease) (1/24/2014); Cancer ([facility] [address] (06/19/2018); Cervicalgia; Pain in joint, ankle and foot; Pain in joint, shoulder region (1/24/2014);  Routine general medical examination at a health care facility; Screening for lipoid disorders; Special screening for malignant neoplasm of prostate; and Unspecified deformity of finger. Mr. Bartolo Jimenez  has a past surgical history that includes hx colonoscopy (2008) and hx heent (06/19/2018). Social History/Living Environment:     Lives in a private home with his wife, challenged with activities with reaching and overhead. Prior Level of Function/Work/Activity:  Works full time as a Technology , notes occasional discomfort in right shoulder, however most discomfort noted with his daily work outs at [employer], limited with how much weight and repetitions. He has been a weight  since the 80s and notes he has needed to significantly decrease the amount of weight he uses. Dominant Side:         RIGHT  Current Medications:       Current Outpatient Prescriptions:     CHARCOAL, by Does Not Apply route., Disp: , Rfl:     OTHER, Indications: Cholrophyll, Disp: , Rfl:     ibuprofen (MOTRIN) 200 mg tablet, Take  by mouth., Disp: , Rfl:     glucosamine (GLUCOSAMINE RELIEF) 1,000 mg tab, Take  by mouth., Disp: , Rfl:     aspirin delayed-release 81 mg tablet, Take  by mouth daily. , Disp: , Rfl:    Date Last Reviewed:  8/15/2018     Number of Personal Factors/Comorbidities that affect the Plan of Care: 1-2: MODERATE COMPLEXITY   EXAMINATION:   Observation/Orthostatic Postural Assessment:           Shoulder symmetry exhibits right elevated compared to left. Shoulder girdles are bilateral protracted, right greater than left. Scapular position is right elevated. Clavicles are right elevated. Soft tissue observations indicates restrictions in right greater than left upper trapezius, levator scapula, pectoralis, latissimus dorsi, subscapularis, infraspinatus and thoracic spine paraspinals. Patient exhibits a neutral cervical lordosis,  increased thoracic kyphosis with convex right type I curve in mid-thoracic spine, and neutral lumbar lordosis. Lower quadrant bony landmarks are left iliac crest elevated slightly. Palpation:  Myofascial assessment indicates restrictions in anterior chest. Muscle length testing levator scapulae with ipsilateral arm abduction is moderate restrictions right. Upper trapezius length is moderate restrictions bilaterally. Pectoralis minor/major and latissimus dorsi length is restricted right greater than left. Scalene muscle length is restricted right greater than left. Recent incision and scar tissue from melanoma removal on right lateral neck. ROM: Gross active cervical spine rotation is 80% available bilaterally. Apley's scratch test for functional ER/IR is right ER to mid-skull posteriorly, IR to iliac crest on right, left WFL. Passive Accessory Motion: Glenohumeral mobility is limited for right inferior glides. Acromioclavicular mobility is moderate restrictions right. Sternoclavicular mobility is moderate restrictions right. Scapulothoracic mobility is poor neuromuscular control of shoulder girdle position, challenged with posterior depression. AROM(PROM) in degrees Right Left   Shoulder flexion 0-140 0-160   Shoulder abduction (palm down) 0-80 0-100   Shoulder extension 0-10 0-20   Shoulder internal rotation (IR)  (measured at 45 degrees of abduction) 0-15 0-30   ER (measured at 90 degrees of abduction) 0-45 0-50     Strength:   Manual Muscle Test (*/5) Right Left   Shoulder flexion 4- 5   Shoulder extension 4 5   Shoulder abduction 4- 5   Shoulder adduction 4 5   Shoulder ER 4- 5   Shoulder IR 4- 5   Upper trapezius 5 5   Middle trapezius 4 4+   Lower trapezius 4 4+   Rhomboids 4 4+   Serratus Anterior 4 4+   Elbow flexion 5 5   Elbow extension 5 5        Special Tests:    Impingement tests performed on the right shoulder:  Lemons-Jose R test: negative. Neer test: negative. Stability tests performed on the right shoulder:  Sulcus sign: negative. Apprehension test: positive. Neurological Screen:  Myotomes: Key muscle strength testing for bilateral UE is intact.   Dermatomes: Sensation testing through bilateral UE for light touch is intact. Reflexes: Biceps (C5), brachioradialis (C6), and triceps (C7) are not tested today. Neural tension tests: Upper limb tension test A is negative for exacerbation of patient's symptoms. Functional Mobility:  Challenged with overhead activities, reaching forward, reaching behind his back. Body Structures Involved:  1. Bones  2. Joints  3. Muscles Body Functions Affected:  1. Sensory/Pain  2. Neuromusculoskeletal  3. Movement Related Activities and Participation Affected:  1. General Tasks and Demands  2. Mobility  3. Self Care  4. Community, Social and [place]   Number of elements (examined above) that affect the Plan of Care: 3: MODERATE COMPLEXITY   CLINICAL PRESENTATION:   Presentation: Evolving clinical presentation with changing clinical characteristics: MODERATE COMPLEXITY   CLINICAL DECISION MAKING:   Outcome Measure: Tool Used: Disabilities of the Arm, Shoulder and Hand (DASH) Questionnaire - Quick Version  Score:  Initial: 25/55  Most Recent: X/55 (Date: -- )   Interpretation of Score: The DASH is designed to measure the activities of daily living in person's with upper extremity dysfunction or pain. Each section is scored on a 1-5 scale, 5 representing the greatest disability. The scores of each section are added together for a total score of 55. Medical Necessity:   · Patient is expected to demonstrate progress in strength, range of motion, balance, coordination and functional technique to increase independence with ADLs, weight lifting techniques, use of his right shoulder girdle without discomfort. .  Reason for Services/Other Comments:  · Patient continues to require skilled intervention due to challenged with use of his right shoulder girdle with daily activities. .   Use of outcome tool(s) and clinical judgement create a POC that gives a: Questionable prediction of patient's progress: MODERATE COMPLEXITY TREATMENT:   (In addition to Assessment/Re-Assessment sessions the following treatments were rendered)  Pre-treatment Symptoms/Complaints:  Patient notes some exercises are getting easier and noting improvement in overall mobility. However still challenged with lifting RUE overhead and IR. Pain: Initial:   Pain Intensity 1: 3  Pain Location 1: Shoulder  Pain Orientation 1: Right  Post Session:  2/10     Therapeutic Exercise: (30 Minutes):  Exercises per grid below to improve mobility, strength, balance and coordination. Required minimal verbal and manual cues to promote proper body alignment, promote proper body posture, promote proper body mechanics and promote proper body breathing techniques. Progressed resistance, range, repetitions and complexity of movement as indicated. Date:  8/15/2018     Activity/Exercise Parameters   Review posture X 5 minutes, patient tends to present with elevated sternum/thoracic spine extension posture   Supine serratus punch X 15 reps, 10 sec holds RUE  X 2 minute hold   Supine abdominal brace  x 5 reps, 30 sec holds   Patient education     Seated scapular retraction  X 10 reps 10 sec holds  X 10 reps, 10 sec  Holds with green band   Seated shoulder elevation with inferior glides X 10 reps, green t-band   Supine shoulder ER X 15 reps, red t-band at 90 degrees abduction   Side lying shoulder arm swings X 10 reps clockwise, counter clockwise   Prone  Y, T, I X 15 reps, 5 sec  Holds on ball         Manual Therapy (    Soft Tissue Mobilization Duration  Duration: 30 Minutes): Manual techniques to facilitate improved motion and decreased pain.  (Used abbreviations: MET - muscle energy technique; PNF - proprioceptive neuromuscular facilitation; NMR - neuromuscular re-education; a/p - anterior to posterior; p/a - posterior to anterior, FMP - functional movement pattern)   · Supine anterior chest soft tissue mobilization with breathing techniques  · Supine right pectoralis major/minor soft tissue mobilization  · Supine right subscapularis and latissimus dorsi soft tissue mobilization with FMP of shoulder elevation  · Supine right GH joint mobilizations, inferior glides  · Supine right GH joint PROM into ER at 90 degrees abduction, followed with prolonged holds and NMR   · Side lying left for right PNF scapular patterns with prolonged holds into posterior depression. · Prone soft tissue mobilization through bony contours around medial aspect of scapula and groove of spine of thoracic spine, right infraspinatus and teres major/minor and latissimus dorsi. Med[employer] Portal  Treatment/Session Assessment:    · Response to Treatment:  Decreased soft tissue restrictions noted in right shoulder girdle, improving overall mobility in right shoulder ROM. · Compliance with Program/Exercises: compliant all of the time. · Recommendations/Intent for next treatment session: \"Next visit will focus on advancements to more challenging activities\".   Total Treatment Duration: 60 minutes  PT Patient Time In/Time Out  Time In: 1630  Time Out: 1138 Wayne Tejeda PT

## 2018-08-22 ENCOUNTER — HOSPITAL ENCOUNTER (OUTPATIENT)
Dept: PHYSICAL THERAPY | Age: 58
Discharge: HOME | End: 2018-08-22
Attending: FAMILY MEDICINE
Payer: COMMERCIAL

## 2018-08-22 PROCEDURE — 97110 THERAPEUTIC EXERCISES: CPT

## 2018-08-22 PROCEDURE — 97140 MANUAL THERAPY 1/> REGIONS: CPT

## 2018-08-22 NOTE — PROGRESS NOTES
Sarah Elizabeth  : 1960  Primary: Adriana Nam Of [facility] Lloydlizy*  Secondary:  [facility], [address] [facility]  [phone]   [phone]       OUTPATIENT PHYSICAL THERAPY:Daily Note 2018   ICD-10: Treatment Diagnosis: pain in joint, right shoulder M25.511  ICD-10: Treatment Diagnosis: shoulder tendonitis, right M75.81  ICD-10: Treatment Diagnosis: pain in thoracic spine M54.6  Precautions/Allergies:   Review of patient's allergies indicates no known allergies. Fall Risk Score: 1 (? 5 = High Risk)  MD Orders: evaluate and treat MEDICAL/REFERRING DIAGNOSIS:  Other shoulder lesions, right shoulder [M75.81]   DATE OF ONSET: one year/chronic  REFERRING PHYSICIAN: Beata Wagner MD  RETURN PHYSICIAN APPOINTMENT: 2019     INITIAL ASSESSMENT:  Mr. Jordin Fernandez is a 59 y.o. male who presents to physical therapy with chronic right shoulder pain and limited ROM. He is challenged with shoulder flexion/elevation, external rotation and internal rotation. He presents with significant mechanical and neuromuscular dysfunctions of the right shoulder girdle and thoracic spine. He would benefit from skilled physical therapy to improve overall mobility in his right shoulder girdle, decrease soft tissue restrictions, improve imbalance of muscles and demonstrate pain free ROM with daily activities. PROBLEM LIST (Impacting functional limitations):  1. Decreased Strength  2. Decreased ADL/Functional Activities  3. Decreased Balance  4. Increased Pain  5. Decreased Activity Tolerance  6. Decreased Flexibility/Joint Mobility INTERVENTIONS PLANNED:  1. Home Exercise Program (HEP)  2. Manual Therapy  3. Neuromuscular Re-education/Strengthening  4. Range of Motion (ROM)  5. Therapeutic Activites  6. Therapeutic Exercise/Strengthening   TREATMENT PLAN:  Effective Dates: 2018 TO 10/22/2018 (90 days).   Frequency/Duration: 2 times a week for 90 Days  GOALS: (Goals have been discussed and agreed upon with patient.)  Discharge Goals: Time Frame: 90 days  1. Patient demonstrates independence with his HEP without verbal cueing from therapist.  2. Patient demonstrates full AROM of right shoulder girdle  3. Demonstrates correct sitting posture and desk set up and able to sit for 1 hour with correct posture techniques. 4. Patient able to perform 1 hour of activity with pain no more than 0-2/10 in right shoulder girdle. 5. Improve DASH outcome measure score from 25/55 to 15/55  Rehabilitation Potential For Stated Goals: Excellent  Regarding Pritesh Watkins's therapy, I certify that the treatment plan above will be carried out by a therapist or under their direction. Thank you for this referral,  Tian Acevedo, PT                 The information in this section was collected on 7/24/18 (except where otherwise noted). HISTORY:   History of Present Injury/Illness (Reason for Referral):  Chronic history of right shoulder pain, started with insidious onset about one year ago and discomfort and popping in shoulder has continued to progress. He is an avid weight  and works out 4-5x a week at [employer], notes he has been lifting weighs since the 80s. Notes he is able to do all activities when his arms are by his sides with no pain or irritation. When he attempts to lift up and overhead as well as reaching out and performing shoulder ER increased pain and discomfort is present. Patient denies dizziness, drop attacks, numbness/tingling, bowel/bladder dysfunction and/or unexplained weight gain/loss. Past Medical History/Comorbidities:   Mr. Leonor Tejada  has a past medical history of CAD (coronary artery disease) (1/24/2014); Cancer ([address] (06/19/2018); Cervicalgia; Pain in joint, ankle and foot; Pain in joint, shoulder region (1/24/2014);  Routine general medical examination at a health care facility; Screening for lipoid disorders; Special screening for malignant neoplasm of prostate; and Unspecified deformity of finger. Mr. Juel Cheadle  has a past surgical history that includes hx colonoscopy (2008); hx heent (06/19/2018); and hx other surgical (06/19/2018). Social History/Living Environment:     Lives in a private home with his wife, challenged with activities with reaching and overhead. Prior Level of Function/Work/Activity:  Works full time as a Technology , notes occasional discomfort in right shoulder, however most discomfort noted with his daily work outs at [employer], limited with how much weight and repetitions. He has been a weight  since the 80s and notes he has needed to significantly decrease the amount of weight he uses. Dominant Side:         RIGHT  Current Medications:       Current Outpatient Prescriptions:     CHARCOAL, by Does Not Apply route., Disp: , Rfl:     OTHER, Indications: Cholrophyll, Disp: , Rfl:     ibuprofen (MOTRIN) 200 mg tablet, Take  by mouth., Disp: , Rfl:     glucosamine (GLUCOSAMINE RELIEF) 1,000 mg tab, Take  by mouth., Disp: , Rfl:     aspirin delayed-release 81 mg tablet, Take  by mouth daily. , Disp: , Rfl:    Date Last Reviewed:  8/22/2018     Number of Personal Factors/Comorbidities that affect the Plan of Care: 1-2: MODERATE COMPLEXITY   EXAMINATION:   Observation/Orthostatic Postural Assessment:           Shoulder symmetry exhibits right elevated compared to left. Shoulder girdles are bilateral protracted, right greater than left. Scapular position is right elevated. Clavicles are right elevated. Soft tissue observations indicates restrictions in right greater than left upper trapezius, levator scapula, pectoralis, latissimus dorsi, subscapularis, infraspinatus and thoracic spine paraspinals. Patient exhibits a neutral cervical lordosis,  increased thoracic kyphosis with convex right type I curve in mid-thoracic spine, and neutral lumbar lordosis.  Lower quadrant bony landmarks are left iliac crest elevated slightly. Palpation:  Myofascial assessment indicates restrictions in anterior chest. Muscle length testing levator scapulae with ipsilateral arm abduction is moderate restrictions right. Upper trapezius length is moderate restrictions bilaterally. Pectoralis minor/major and latissimus dorsi length is restricted right greater than left. Scalene muscle length is restricted right greater than left. Recent incision and scar tissue from melanoma removal on right lateral neck. ROM: Gross active cervical spine rotation is 80% available bilaterally. Apley's scratch test for functional ER/IR is right ER to mid-skull posteriorly, IR to iliac crest on right, left WFL. Passive Accessory Motion: Glenohumeral mobility is limited for right inferior glides. Acromioclavicular mobility is moderate restrictions right. Sternoclavicular mobility is moderate restrictions right. Scapulothoracic mobility is poor neuromuscular control of shoulder girdle position, challenged with posterior depression. AROM(PROM) in degrees Right Left   Shoulder flexion 0-140 0-160   Shoulder abduction (palm down) 0-80 0-100   Shoulder extension 0-10 0-20   Shoulder internal rotation (IR)  (measured at 45 degrees of abduction) 0-15 0-30   ER (measured at 90 degrees of abduction) 0-45 0-50     Strength:   Manual Muscle Test (*/5) Right Left   Shoulder flexion 4- 5   Shoulder extension 4 5   Shoulder abduction 4- 5   Shoulder adduction 4 5   Shoulder ER 4- 5   Shoulder IR 4- 5   Upper trapezius 5 5   Middle trapezius 4 4+   Lower trapezius 4 4+   Rhomboids 4 4+   Serratus Anterior 4 4+   Elbow flexion 5 5   Elbow extension 5 5        Special Tests:    Impingement tests performed on the right shoulder:  Lemons-Menno test: negative. Neer test: negative. Stability tests performed on the right shoulder:  Sulcus sign: negative. Apprehension test: positive.     Neurological Screen:  Myotomes: Key muscle strength testing for bilateral UE is intact. Dermatomes: Sensation testing through bilateral UE for light touch is intact. Reflexes: Biceps (C5), brachioradialis (C6), and triceps (C7) are not tested today. Neural tension tests: Upper limb tension test A is negative for exacerbation of patient's symptoms. Functional Mobility:  Challenged with overhead activities, reaching forward, reaching behind his back. Body Structures Involved:  1. Bones  2. Joints  3. Muscles Body Functions Affected:  1. Sensory/Pain  2. Neuromusculoskeletal  3. Movement Related Activities and Participation Affected:  1. General Tasks and Demands  2. Mobility  3. Self Care  4. Community, Social and [place]   Number of elements (examined above) that affect the Plan of Care: 3: MODERATE COMPLEXITY   CLINICAL PRESENTATION:   Presentation: Evolving clinical presentation with changing clinical characteristics: MODERATE COMPLEXITY   CLINICAL DECISION MAKING:   Outcome Measure: Tool Used: Disabilities of the Arm, Shoulder and Hand (DASH) Questionnaire - Quick Version  Score:  Initial: 25/55  Most Recent: X/55 (Date: -- )   Interpretation of Score: The DASH is designed to measure the activities of daily living in person's with upper extremity dysfunction or pain. Each section is scored on a 1-5 scale, 5 representing the greatest disability. The scores of each section are added together for a total score of 55. Medical Necessity:   · Patient is expected to demonstrate progress in strength, range of motion, balance, coordination and functional technique to increase independence with ADLs, weight lifting techniques, use of his right shoulder girdle without discomfort. .  Reason for Services/Other Comments:  · Patient continues to require skilled intervention due to challenged with use of his right shoulder girdle with daily activities. .   Use of outcome tool(s) and clinical judgement create a POC that gives a: Questionable prediction of patient's progress: MODERATE COMPLEXITY            TREATMENT:   (In addition to Assessment/Re-Assessment sessions the following treatments were rendered)  Pre-treatment Symptoms/Complaints:  Patient notes less pain overall, continues to feel a catch with shoulder flexion after 120 degrees. All exercises are going well, challenged with Y exercise   Pain: Initial:   Pain Intensity 1: 3  Pain Location 1: Shoulder  Pain Orientation 1: Right  Post Session:  2/10     Therapeutic Exercise: (35 Minutes):  Exercises per grid below to improve mobility, strength, balance and coordination. Required minimal verbal and manual cues to promote proper body alignment, promote proper body posture, promote proper body mechanics and promote proper body breathing techniques. Progressed resistance, range, repetitions and complexity of movement as indicated. Date:  8/22/2018     Activity/Exercise Parameters   Review posture X 5 minutes, patient tends to present with elevated sternum/thoracic spine extension posture   Supine serratus punch X 15 reps, 10 sec holds RUE  X 2 minute hold   Supine abdominal brace  x 5 reps, 30 sec holds   Patient education  x 5 minute education of [address] [place] [address] joint mechanics and neuromuscular re-education   Seated scapular retraction  X 10 reps 10 sec holds  X 10 reps, 10 sec  Holds with green band   Seated shoulder elevation with inferior glides X 10 reps, with mob belt and hand hold position to improve technique and understanding of activation. Supine shoulder ER X 15 reps, red t-band at 90 degrees abduction   Side lying shoulder arm swings X 10 reps clockwise, counter clockwise   Prone  Y, T, I X 15 reps, 5 sec  Holds on ball   1/2 prone scapular stabilization with shoulder ER X 1 minute hold x 3 reps  X 5 minute education of correct techniques     Manual Therapy (    Soft Tissue Mobilization Duration  Duration: 25 Minutes): Manual techniques to facilitate improved motion and decreased pain.  (Used abbreviations: MET - muscle energy technique; PNF - proprioceptive neuromuscular facilitation; NMR - neuromuscular re-education; a/p - anterior to posterior; p/a - posterior to anterior, FMP - functional movement pattern)   · Supine anterior chest soft tissue mobilization with breathing techniques  · Supine right pectoralis major/minor soft tissue mobilization  · Supine right subscapularis and latissimus dorsi soft tissue mobilization with FMP of shoulder elevation  · Supine right GH joint mobilizations, inferior glides  · Supine right GH joint PROM into ER at 90 degrees abduction, followed with prolonged holds and NMR   · Side lying left for right PNF scapular patterns with prolonged holds into posterior depression. [facility]  Treatment/Session Assessment:    · Response to Treatment: improved scapular and GH joint stability, continues to demonstrate challenges with kinetic chain stabilization of RUE. · Compliance with Program/Exercises: compliant all of the time. · Recommendations/Intent for next treatment session: \"Next visit will focus on advancements to more challenging activities\".   Total Treatment Duration: 60 minutes  PT Patient Time In/Time Out  Time In: 9071  Time Out: 9280 [address] Veronika Vizcarra, ELI

## 2018-08-30 ENCOUNTER — HOSPITAL ENCOUNTER (OUTPATIENT)
Dept: PHYSICAL THERAPY | Age: 58
Discharge: HOME | End: 2018-08-30
Attending: FAMILY MEDICINE
Payer: COMMERCIAL

## 2018-08-30 PROCEDURE — 97110 THERAPEUTIC EXERCISES: CPT

## 2018-08-30 PROCEDURE — 97140 MANUAL THERAPY 1/> REGIONS: CPT

## 2018-08-30 NOTE — PROGRESS NOTES
Xin Keys  : 1960  Primary: Stefan [facility]*  Secondary:  [facility] [place] [address], [address] [facility]  [phone]   [phone]       OUTPATIENT PHYSICAL THERAPY:Daily Note 2018   ICD-10: Treatment Diagnosis: pain in joint, right shoulder M25.511  ICD-10: Treatment Diagnosis: shoulder tendonitis, right M75.81  ICD-10: Treatment Diagnosis: pain in thoracic spine M54.6  Precautions/Allergies:   Review of patient's allergies indicates no known allergies. Fall Risk Score: 1 (? 5 = High Risk)  MD Orders: evaluate and treat MEDICAL/REFERRING DIAGNOSIS:  Other shoulder lesions, right shoulder [M75.81]   DATE OF ONSET: one year/chronic  REFERRING PHYSICIAN: Eve Jaffe MD  RETURN PHYSICIAN APPOINTMENT: 2019     INITIAL ASSESSMENT:  Mr. Charlotte Velasquez is a 59 y.o. male who presents to physical therapy with chronic right shoulder pain and limited ROM. He is challenged with shoulder flexion/elevation, external rotation and internal rotation. He presents with significant mechanical and neuromuscular dysfunctions of the right shoulder girdle and thoracic spine. He would benefit from skilled physical therapy to improve overall mobility in his right shoulder girdle, decrease soft tissue restrictions, improve imbalance of muscles and demonstrate pain free ROM with daily activities. PROBLEM LIST (Impacting functional limitations):  1. Decreased Strength  2. Decreased ADL/Functional Activities  3. Decreased Balance  4. Increased Pain  5. Decreased Activity Tolerance  6. Decreased Flexibility/Joint Mobility INTERVENTIONS PLANNED:  1. Home Exercise Program (HEP)  2. Manual Therapy  3. Neuromuscular Re-education/Strengthening  4. Range of Motion (ROM)  5. Therapeutic Activites  6. Therapeutic Exercise/Strengthening   TREATMENT PLAN:  Effective Dates: 2018 TO 10/22/2018 (90 days).   Frequency/Duration: 2 times a week for 90 Days  GOALS: (Goals have been discussed and agreed upon with patient.)  Discharge Goals: Time Frame: 90 days  1. Patient demonstrates independence with his HEP without verbal cueing from therapist.  2. Patient demonstrates full AROM of right shoulder girdle  3. Demonstrates correct sitting posture and desk set up and able to sit for 1 hour with correct posture techniques. 4. Patient able to perform 1 hour of activity with pain no more than 0-2/10 in right shoulder girdle. 5. Improve DASH outcome measure score from 25/55 to 15/55  Rehabilitation Potential For Stated Goals: Excellent  Regarding Pritesh Watkins's therapy, I certify that the treatment plan above will be carried out by a therapist or under their direction. Thank you for this referral,  Christofer Kraft, PT                 The information in this section was collected on 7/24/18 (except where otherwise noted). HISTORY:   History of Present Injury/Illness (Reason for Referral):  Chronic history of right shoulder pain, started with insidious onset about one year ago and discomfort and popping in shoulder has continued to progress. He is an avid weight  and works out 4-5x a week at [employer], notes he has been lifting weighs since the 80s. Notes he is able to do all activities when his arms are by his sides with no pain or irritation. When he attempts to lift up and overhead as well as reaching out and performing shoulder ER increased pain and discomfort is present. Patient denies dizziness, drop attacks, numbness/tingling, bowel/bladder dysfunction and/or unexplained weight gain/loss. Past Medical History/Comorbidities:   Mr. Dilan Meraz  has a past medical history of CAD (coronary artery disease) (1/24/2014); Cancer ([address] (06/19/2018); Cervicalgia; Pain in joint, ankle and foot; Pain in joint, shoulder region (1/24/2014);  Routine general medical examination at a health care facility; Screening for lipoid disorders; Special screening for malignant neoplasm of prostate; and Unspecified deformity of finger. Mr. Roque Adames  has a past surgical history that includes hx colonoscopy (2008); hx heent (06/19/2018); and hx other surgical (06/19/2018). Social History/Living Environment:     Lives in a private home with his wife, challenged with activities with reaching and overhead. Prior Level of Function/Work/Activity:  Works full time as a Technology , notes occasional discomfort in right shoulder, however most discomfort noted with his daily work outs at [employer], limited with how much weight and repetitions. He has been a weight  since the 80s and notes he has needed to significantly decrease the amount of weight he uses. Dominant Side:         RIGHT  Current Medications:       Current Outpatient Prescriptions:     CHARCOAL, by Does Not Apply route., Disp: , Rfl:     OTHER, Indications: Cholrophyll, Disp: , Rfl:     ibuprofen (MOTRIN) 200 mg tablet, Take  by mouth., Disp: , Rfl:     glucosamine (GLUCOSAMINE RELIEF) 1,000 mg tab, Take  by mouth., Disp: , Rfl:     aspirin delayed-release 81 mg tablet, Take  by mouth daily. , Disp: , Rfl:    Date Last Reviewed:  8/30/2018     Number of Personal Factors/Comorbidities that affect the Plan of Care: 1-2: MODERATE COMPLEXITY   EXAMINATION:   Observation/Orthostatic Postural Assessment:           Shoulder symmetry exhibits right elevated compared to left. Shoulder girdles are bilateral protracted, right greater than left. Scapular position is right elevated. Clavicles are right elevated. Soft tissue observations indicates restrictions in right greater than left upper trapezius, levator scapula, pectoralis, latissimus dorsi, subscapularis, infraspinatus and thoracic spine paraspinals. Patient exhibits a neutral cervical lordosis,  increased thoracic kyphosis with convex right type I curve in mid-thoracic spine, and neutral lumbar lordosis.  Lower quadrant bony landmarks are left iliac crest elevated slightly. Palpation:  Myofascial assessment indicates restrictions in anterior chest. Muscle length testing levator scapulae with ipsilateral arm abduction is moderate restrictions right. Upper trapezius length is moderate restrictions bilaterally. Pectoralis minor/major and latissimus dorsi length is restricted right greater than left. Scalene muscle length is restricted right greater than left. Recent incision and scar tissue from melanoma removal on right lateral neck. ROM: Gross active cervical spine rotation is 80% available bilaterally. Apley's scratch test for functional ER/IR is right ER to mid-skull posteriorly, IR to iliac crest on right, left WFL. Passive Accessory Motion: Glenohumeral mobility is limited for right inferior glides. Acromioclavicular mobility is moderate restrictions right. Sternoclavicular mobility is moderate restrictions right. Scapulothoracic mobility is poor neuromuscular control of shoulder girdle position, challenged with posterior depression. AROM(PROM) in degrees Right Left   Shoulder flexion 0-140 0-160   Shoulder abduction (palm down) 0-80 0-100   Shoulder extension 0-10 0-20   Shoulder internal rotation (IR)  (measured at 45 degrees of abduction) 0-15 0-30   ER (measured at 90 degrees of abduction) 0-45 0-50     Strength:   Manual Muscle Test (*/5) Right Left   Shoulder flexion 4- 5   Shoulder extension 4 5   Shoulder abduction 4- 5   Shoulder adduction 4 5   Shoulder ER 4- 5   Shoulder IR 4- 5   Upper trapezius 5 5   Middle trapezius 4 4+   Lower trapezius 4 4+   Rhomboids 4 4+   Serratus Anterior 4 4+   Elbow flexion 5 5   Elbow extension 5 5        Special Tests:    Impingement tests performed on the right shoulder:  [place] test: negative. Neer test: negative. Stability tests performed on the right shoulder:  Sulcus sign: negative. Apprehension test: positive.     Neurological Screen:  Myotomes: Key muscle strength testing for bilateral UE is intact. Dermatomes: Sensation testing through bilateral UE for light touch is intact. Reflexes: Biceps (C5), brachioradialis (C6), and triceps (C7) are not tested today. Neural tension tests: Upper limb tension test A is negative for exacerbation of patient's symptoms. Functional Mobility:  Challenged with overhead activities, reaching forward, reaching behind his back. Body Structures Involved:  1. Bones  2. Joints  3. Muscles Body Functions Affected:  1. Sensory/Pain  2. Neuromusculoskeletal  3. Movement Related Activities and Participation Affected:  1. General Tasks and Demands  2. Mobility  3. Self Care  4. Community, Social and [place]   Number of elements (examined above) that affect the Plan of Care: 3: MODERATE COMPLEXITY   CLINICAL PRESENTATION:   Presentation: Evolving clinical presentation with changing clinical characteristics: MODERATE COMPLEXITY   CLINICAL DECISION MAKING:   Outcome Measure: Tool Used: Disabilities of the Arm, Shoulder and Hand (DASH) Questionnaire - Quick Version  Score:  Initial: 25/55  Most Recent: X/55 (Date: -- )   Interpretation of Score: The DASH is designed to measure the activities of daily living in person's with upper extremity dysfunction or pain. Each section is scored on a 1-5 scale, 5 representing the greatest disability. The scores of each section are added together for a total score of 55. Medical Necessity:   · Patient is expected to demonstrate progress in strength, range of motion, balance, coordination and functional technique to increase independence with ADLs, weight lifting techniques, use of his right shoulder girdle without discomfort. .  Reason for Services/Other Comments:  · Patient continues to require skilled intervention due to challenged with use of his right shoulder girdle with daily activities. .   Use of outcome tool(s) and clinical judgement create a POC that gives a: Questionable prediction of patient's progress: MODERATE COMPLEXITY            TREATMENT:   (In addition to Assessment/Re-Assessment sessions the following treatments were rendered)  Pre-treatment Symptoms/Complaints:  Patient notes right shoulder irritated this weekend after doing more activities outside, however by Wednesday pain decreased and improved mobility. Pain: Initial:   Pain Intensity 1: 2  Pain Location 1: Shoulder  Pain Orientation 1: Right  Post Session:  1/10     Therapeutic Exercise: (30 Minutes):  Exercises per grid below to improve mobility, strength, balance and coordination. Required minimal verbal and manual cues to promote proper body alignment, promote proper body posture, promote proper body mechanics and promote proper body breathing techniques. Progressed resistance, range, repetitions and complexity of movement as indicated. Date:  8/30/2018     Activity/Exercise Parameters   Review posture X 5 minutes, patient tends to present with elevated sternum/thoracic spine extension posture   Supine serratus punch X 15 reps, 10 sec holds RUE  X 2 minute hold   Supine abdominal brace  x 5 reps, 30 sec holds   Patient education  x 5 minute education of [facility] joint mechanics and neuromuscular re-education   Seated scapular retraction  X 10 reps 10 sec holds  X 10 reps, 10 sec  Holds with green band   Seated shoulder elevation with inferior glides X 10 reps, with mob belt and hand hold position to improve technique and understanding of activation.     Supine shoulder ER X 15 reps, red t-band at 90 degrees abduction   Side lying shoulder arm swings X 10 reps clockwise, counter clockwise   Prone  Y, T, I X 15 reps, 5 sec  Holds on ball   1/2 prone scapular stabilization with shoulder ER X 1 minute hold x 3 reps  X 5 minute education of correct techniques   Diaphragmatic breathing X 5 minute education supine and sitting, wavy gravy    Supine isometric activation of GH joint X 10 reps 5 sec holds     Manual Therapy (    Soft Tissue Mobilization Duration  Duration: 30 Minutes): Manual techniques to facilitate improved motion and decreased pain. (Used abbreviations: MET - muscle energy technique; PNF - proprioceptive neuromuscular facilitation; NMR - neuromuscular re-education; a/p - anterior to posterior; p/a - posterior to anterior, FMP - functional movement pattern)   · Supine anterior chest soft tissue mobilization with breathing techniques  · Supine right pectoralis major/minor soft tissue mobilization  · Supine right subscapularis and latissimus dorsi soft tissue mobilization with FMP of shoulder elevation  · Supine right GH joint mobilizations, inferior glides  · Supine right GH joint PROM into ER at 90 degrees abduction, followed with prolonged holds and NMR   · Side lying left for right PNF scapular patterns with prolonged holds into posterior depression. · Side lying accessory and function right costal cage mobility with resisted breathing        [facility] Portal  Treatment/Session Assessment:    · Response to Treatment: improved awareness of diaphragmatic breathing and costal cage mobility. Overall improving right shoulder ROM    · Compliance with Program/Exercises: compliant all of the time. · Recommendations/Intent for next treatment session: \"Next visit will focus on advancements to more challenging activities\".   Total Treatment Duration: 60 minutes  PT Patient Time In/Time Out  Time In: 1630  Time Out: [address] Wayne Petit PT

## 2018-09-17 ENCOUNTER — HOSPITAL ENCOUNTER (OUTPATIENT)
Dept: PHYSICAL THERAPY | Age: 58
Discharge: HOME | End: 2018-09-17
Attending: FAMILY MEDICINE
Payer: COMMERCIAL

## 2018-09-17 PROCEDURE — 97140 MANUAL THERAPY 1/> REGIONS: CPT

## 2018-09-17 PROCEDURE — 97110 THERAPEUTIC EXERCISES: CPT

## 2018-09-18 NOTE — PROGRESS NOTES
Torey Rea  : 1960  Primary: Faviola Hardenchristina Of Silvia Garcia*  Secondary:  [facility], [address] [facility]  [phone]   [phone]       OUTPATIENT PHYSICAL THERAPY:Daily Note 2018   ICD-10: Treatment Diagnosis: pain in joint, right shoulder M25.511  ICD-10: Treatment Diagnosis: shoulder tendonitis, right M75.81  ICD-10: Treatment Diagnosis: pain in thoracic spine M54.6  Precautions/Allergies:   Review of patient's allergies indicates no known allergies. Fall Risk Score: 1 (? 5 = High Risk)  MD Orders: evaluate and treat MEDICAL/REFERRING DIAGNOSIS:  Other shoulder lesions, right shoulder [M75.81]   DATE OF ONSET: one year/chronic  REFERRING PHYSICIAN: Ryland Holloway MD  RETURN PHYSICIAN APPOINTMENT: 2019     INITIAL ASSESSMENT:  Mr. Fallon Castillo is a 59 y.o. male who presents to physical therapy with chronic right shoulder pain and limited ROM. He is challenged with shoulder flexion/elevation, external rotation and internal rotation. He presents with significant mechanical and neuromuscular dysfunctions of the right shoulder girdle and thoracic spine. He would benefit from skilled physical therapy to improve overall mobility in his right shoulder girdle, decrease soft tissue restrictions, improve imbalance of muscles and demonstrate pain free ROM with daily activities. PROBLEM LIST (Impacting functional limitations):  1. Decreased Strength  2. Decreased ADL/Functional Activities  3. Decreased Balance  4. Increased Pain  5. Decreased Activity Tolerance  6. Decreased Flexibility/Joint Mobility INTERVENTIONS PLANNED:  1. Home Exercise Program (HEP)  2. Manual Therapy  3. Neuromuscular Re-education/Strengthening  4. Range of Motion (ROM)  5. Therapeutic Activites  6. Therapeutic Exercise/Strengthening   TREATMENT PLAN:  Effective Dates: 2018 TO 10/22/2018 (90 days).   Frequency/Duration: 2 times a week for 90 Days  GOALS: (Goals have been discussed and agreed upon with patient.)  Discharge Goals: Time Frame: 90 days  1. Patient demonstrates independence with his HEP without verbal cueing from therapist.  2. Patient demonstrates full AROM of right shoulder girdle  3. Demonstrates correct sitting posture and desk set up and able to sit for 1 hour with correct posture techniques. 4. Patient able to perform 1 hour of activity with pain no more than 0-2/10 in right shoulder girdle. 5. Improve DASH outcome measure score from 25/55 to 15/55  Rehabilitation Potential For Stated Goals: Excellent  Regarding Pritesh Watkins's therapy, I certify that the treatment plan above will be carried out by a therapist or under their direction. Thank you for this referral,  Jaime Mcginnis PT                 The information in this section was collected on 7/24/18 (except where otherwise noted). HISTORY:   History of Present Injury/Illness (Reason for Referral):  Chronic history of right shoulder pain, started with insidious onset about one year ago and discomfort and popping in shoulder has continued to progress. He is an avid weight  and works out 4-5x a week at [employer], notes he has been lifting weighs since the 80s. Notes he is able to do all activities when his arms are by his sides with no pain or irritation. When he attempts to lift up and overhead as well as reaching out and performing shoulder ER increased pain and discomfort is present. Patient denies dizziness, drop attacks, numbness/tingling, bowel/bladder dysfunction and/or unexplained weight gain/loss. Past Medical History/Comorbidities:   Mr. Alfredo Gallegos  has a past medical history of CAD (coronary artery disease) (1/24/2014); Cancer ([facility] [address] (06/19/2018); Cervicalgia; Pain in joint, ankle and foot; Pain in joint, shoulder region (1/24/2014);  Routine general medical examination at a health care facility; Screening for lipoid disorders; Special screening for malignant neoplasm of prostate; and Unspecified deformity of finger. Mr. Mis Adames  has a past surgical history that includes hx colonoscopy (2008); hx heent (06/19/2018); and hx other surgical (06/19/2018). Social History/Living Environment:     Lives in a private home with his wife, challenged with activities with reaching and overhead. Prior Level of Function/Work/Activity:  Works full time as a Technology , notes occasional discomfort in right shoulder, however most discomfort noted with his daily work outs at [employer], limited with how much weight and repetitions. He has been a weight  since the 80s and notes he has needed to significantly decrease the amount of weight he uses. Dominant Side:         RIGHT  Current Medications:       Current Outpatient Prescriptions:     CHARCOAL, by Does Not Apply route., Disp: , Rfl:     OTHER, Indications: Cholrophyll, Disp: , Rfl:     ibuprofen (MOTRIN) 200 mg tablet, Take  by mouth., Disp: , Rfl:     glucosamine (GLUCOSAMINE RELIEF) 1,000 mg tab, Take  by mouth., Disp: , Rfl:     aspirin delayed-release 81 mg tablet, Take  by mouth daily. , Disp: , Rfl:    Date Last Reviewed:  9/17/2018     Number of Personal Factors/Comorbidities that affect the Plan of Care: 1-2: MODERATE COMPLEXITY   EXAMINATION:   Observation/Orthostatic Postural Assessment:           Shoulder symmetry exhibits right elevated compared to left. Shoulder girdles are bilateral protracted, right greater than left. Scapular position is right elevated. Clavicles are right elevated. Soft tissue observations indicates restrictions in right greater than left upper trapezius, levator scapula, pectoralis, latissimus dorsi, subscapularis, infraspinatus and thoracic spine paraspinals. Patient exhibits a neutral cervical lordosis,  increased thoracic kyphosis with convex right type I curve in mid-thoracic spine, and neutral lumbar lordosis.  Lower quadrant bony landmarks are left iliac crest elevated slightly. Palpation:  Myofascial assessment indicates restrictions in anterior chest. Muscle length testing levator scapulae with ipsilateral arm abduction is moderate restrictions right. Upper trapezius length is moderate restrictions bilaterally. Pectoralis minor/major and latissimus dorsi length is restricted right greater than left. Scalene muscle length is restricted right greater than left. Recent incision and scar tissue from melanoma removal on right lateral neck. ROM: Gross active cervical spine rotation is 80% available bilaterally. Apley's scratch test for functional ER/IR is right ER to mid-skull posteriorly, IR to iliac crest on right, left WFL. Passive Accessory Motion: Glenohumeral mobility is limited for right inferior glides. Acromioclavicular mobility is moderate restrictions right. Sternoclavicular mobility is moderate restrictions right. Scapulothoracic mobility is poor neuromuscular control of shoulder girdle position, challenged with posterior depression. AROM(PROM) in degrees Right Left   Shoulder flexion 0-140 0-160   Shoulder abduction (palm down) 0-80 0-100   Shoulder extension 0-10 0-20   Shoulder internal rotation (IR)  (measured at 45 degrees of abduction) 0-15 0-30   ER (measured at 90 degrees of abduction) 0-45 0-50     Strength:   Manual Muscle Test (*/5) Right Left   Shoulder flexion 4- 5   Shoulder extension 4 5   Shoulder abduction 4- 5   Shoulder adduction 4 5   Shoulder ER 4- 5   Shoulder IR 4- 5   Upper trapezius 5 5   Middle trapezius 4 4+   Lower trapezius 4 4+   Rhomboids 4 4+   Serratus Anterior 4 4+   Elbow flexion 5 5   Elbow extension 5 5        Special Tests:    Impingement tests performed on the right shoulder:  Lemons-Mobile test: negative. Neer test: negative. Stability tests performed on the right shoulder:  Sulcus sign: negative. Apprehension test: positive.     Neurological Screen:  Myotomes: Key muscle strength testing for bilateral UE is intact. Dermatomes: Sensation testing through bilateral UE for light touch is intact. Reflexes: Biceps (C5), brachioradialis (C6), and triceps (C7) are not tested today. Neural tension tests: Upper limb tension test A is negative for exacerbation of patient's symptoms. Functional Mobility:  Challenged with overhead activities, reaching forward, reaching behind his back. Body Structures Involved:  1. Bones  2. Joints  3. Muscles Body Functions Affected:  1. Sensory/Pain  2. Neuromusculoskeletal  3. Movement Related Activities and Participation Affected:  1. General Tasks and Demands  2. Mobility  3. Self Care  4. Community, Social and [place]   Number of elements (examined above) that affect the Plan of Care: 3: MODERATE COMPLEXITY   CLINICAL PRESENTATION:   Presentation: Evolving clinical presentation with changing clinical characteristics: MODERATE COMPLEXITY   CLINICAL DECISION MAKING:   Outcome Measure: Tool Used: Disabilities of the Arm, Shoulder and Hand (DASH) Questionnaire - Quick Version  Score:  Initial: 25/55  Most Recent: X/55 (Date: -- )   Interpretation of Score: The DASH is designed to measure the activities of daily living in person's with upper extremity dysfunction or pain. Each section is scored on a 1-5 scale, 5 representing the greatest disability. The scores of each section are added together for a total score of 55. Medical Necessity:   · Patient is expected to demonstrate progress in strength, range of motion, balance, coordination and functional technique to increase independence with ADLs, weight lifting techniques, use of his right shoulder girdle without discomfort. .  Reason for Services/Other Comments:  · Patient continues to require skilled intervention due to challenged with use of his right shoulder girdle with daily activities. .   Use of outcome tool(s) and clinical judgement create a POC that gives a: Questionable prediction of patient's progress: MODERATE COMPLEXITY            TREATMENT:   (In addition to Assessment/Re-Assessment sessions the following treatments were rendered)  Pre-treatment Symptoms/Complaints:  Patient notes continued improvements with ROM and strength. Continues to have a catch with reaching backwards and behind. Pain: Initial:   Pain Intensity 1: 2  Pain Location 1: Shoulder  Pain Orientation 1: Right  Post Session:  1/10     Therapeutic Exercise: (30 Minutes):  Exercises per grid below to improve mobility, strength, balance and coordination. Required minimal verbal and manual cues to promote proper body alignment, promote proper body posture, promote proper body mechanics and promote proper body breathing techniques. Progressed resistance, range, repetitions and complexity of movement as indicated. Date:  9/17/2018     Activity/Exercise Parameters   Review posture X 5 minutes, patient tends to present with elevated sternum/thoracic spine extension posture   Supine serratus punch X 15 reps, 10 sec holds RUE  X 2 minute hold   Supine abdominal brace    Patient education    Seated scapular retraction    Seated shoulder elevation with inferior glides    Supine shoulder ER    Side lying shoulder arm swings    Prone  Y, T, I    1/2 prone scapular stabilization with shoulder ER X 1 minute hold x 3 reps  X 5 minute education of correct techniques   Diaphragmatic breathing X 5 minute education supine and sitting, wavy gravy    Supine isometric activation of GH joint X 10 reps 5 sec holds   Latissimus stretches X 5 minute review in quadriped and sitting. Review of pectoralis stretch on foam roller and standing. Manual Therapy (    Soft Tissue Mobilization Duration  Duration: 30 Minutes): Manual techniques to facilitate improved motion and decreased pain.  (Used abbreviations: MET - muscle energy technique; PNF - proprioceptive neuromuscular facilitation; NMR - neuromuscular re-education; a/p - anterior to posterior; p/a - posterior to anterior, FMP - functional movement pattern)   · Supine anterior chest soft tissue mobilization with breathing techniques  · Supine right pectoralis major/minor soft tissue mobilization  · Supine right subscapularis and latissimus dorsi soft tissue mobilization with FMP of shoulder elevation  · Supine right GH joint mobilizations, inferior glides  · Supine right GH joint PROM into ER at 90 degrees abduction, followed with prolonged holds and NMR   · Side lying left for right PNF scapular patterns with prolonged holds into posterior depression. · Side lying accessory and function right costal cage mobility with resisted breathing  · Side lying left for right latissimus dorsi soft tissue mobilization and distraction of superficial fascia  · With written consent received and precautions reviewed, instrument-assisted soft tissue mobilization was performed to right latissimus dorsi for the purpose of trigger point release with a positive treatment effect and no complications noted. [facility]  Treatment/Session Assessment:    · Response to Treatment: decreased restrictions in right latissimus dorsi and improved mobility of right shoulder girdle at end off session. · Compliance with Program/Exercises: compliant all of the time. · Recommendations/Intent for next treatment session: \"Next visit will focus on advancements to more challenging activities\". Will work independently for 1 month.    Total Treatment Duration: 60 minutes  PT Patient Time In/Time Out  Time In: 9225  Time Out: [address] [facility] Cost, PT

## 2018-10-18 ENCOUNTER — HOSPITAL ENCOUNTER (OUTPATIENT)
Dept: PHYSICAL THERAPY | Age: 58
Discharge: HOME | End: 2018-10-18
Attending: FAMILY MEDICINE
Payer: COMMERCIAL

## 2018-10-18 PROCEDURE — 97110 THERAPEUTIC EXERCISES: CPT

## 2018-10-18 PROCEDURE — 97140 MANUAL THERAPY 1/> REGIONS: CPT

## 2018-10-19 NOTE — THERAPY RECERTIFICATION
Yasmine Linares  : 1960  Primary: Everardoleny Emily Of Silvia Garcia*  Secondary:  Therapy Center at [address]  [address], [address]  [phone]   [phone]       OUTPATIENT PHYSICAL THERAPY:Daily Note and Recertification    ICD-10: Treatment Diagnosis: pain in joint, right shoulder M25.511  ICD-10: Treatment Diagnosis: shoulder tendonitis, right M75.81  ICD-10: Treatment Diagnosis: pain in thoracic spine M54.6  Precautions/Allergies:   Patient has no known allergies. Fall Risk Score: 1 (? 5 = High Risk)  MD Orders: evaluate and treat MEDICAL/REFERRING DIAGNOSIS:  Other shoulder lesions, right shoulder [M75.81]   DATE OF ONSET: one year/chronic  REFERRING PHYSICIAN: Rex Davis MD  RETURN PHYSICIAN APPOINTMENT: [id number]     RE-CERTIFICATION: : Mr. González Fernandez has benefited from therapy to improve his right shoulder mobility and postural awareness. He has improved ROM and strength, he continues to present restrictions in thoracic spine extension, end ranges of right shoulder mobility and soft tissue restrictions. He has been compliant with his HEP and would benefit from additional therapy secondary to continued pain levels and restricted end-range mobility of right shoulder. INITIAL ASSESSMENT:  Mr. González Fernandez is a 59 y.o. male who presents to physical therapy with chronic right shoulder pain and limited ROM. He is challenged with shoulder flexion/elevation, external rotation and internal rotation. He presents with significant mechanical and neuromuscular dysfunctions of the right shoulder girdle and thoracic spine. He would benefit from skilled physical therapy to improve overall mobility in his right shoulder girdle, decrease soft tissue restrictions, improve imbalance of muscles and demonstrate pain free ROM with daily activities. PROBLEM LIST (Impacting functional limitations):  1. Decreased Strength  2.  Decreased ADL/Functional Activities  3. Decreased Balance  4. Increased Pain  5. Decreased Activity Tolerance  6. Decreased Flexibility/Joint Mobility INTERVENTIONS PLANNED:  1. Home Exercise Program (HEP)  2. Manual Therapy  3. Neuromuscular Re-education/Strengthening  4. Range of Motion (ROM)  5. Therapeutic Activites  6. Therapeutic Exercise/Strengthening   TREATMENT PLAN:  Effective Dates: 10/18/18 TO 1/16/2019 (90 days). Frequency/Duration: 1 time a month for 90 Days  GOALS: (Goals have been discussed and agreed upon with patient.)  Discharge Goals: Time Frame: 90 days  1. Patient demonstrates independence with his HEP without verbal cueing from therapist. GOAL MET  2. Patient demonstrates full AROM of right shoulder girdle - ONGOING  3. Demonstrates correct sitting posture and desk set up and able to sit for 1 hour with correct posture techniques. GOAL MET  4. Patient able to perform 1 hour of activity with pain no more than 0-2/10 in right shoulder girdle. ONGOING  5. Improve DASH outcome measure score from 25/55 to 15/55 - ONGOING  Rehabilitation Potential For Stated Goals: Excellent  Regarding Pritesh Watkins's therapy, I certify that the treatment plan above will be carried out by a therapist or under their direction. Thank you for this referral,  Jacklyn Chase PT                 The information in this section was collected on 7/24/18 (except where otherwise noted). HISTORY:   History of Present Injury/Illness (Reason for Referral):  Chronic history of right shoulder pain, started with insidious onset about one year ago and discomfort and popping in shoulder has continued to progress. He is an avid weight  and works out 4-5x a week at [employer], notes he has been lifting weighs since the 80s. Notes he is able to do all activities when his arms are by his sides with no pain or irritation.  When he attempts to lift up and overhead as well as reaching out and performing shoulder ER increased pain and discomfort is present. Patient denies dizziness, drop attacks, numbness/tingling, bowel/bladder dysfunction and/or unexplained weight gain/loss. Past Medical History/Comorbidities:   Mr. Sheba Mark  has a past medical history of CAD (coronary artery disease), Cancer ([address] Cervicalgia, Pain in joint, ankle and foot, Pain in joint, shoulder region, Routine general medical examination at a health care facility, Screening for lipoid disorders, Special screening for malignant neoplasm of prostate, and Unspecified deformity of finger. Mr. Sheba Mark  has a past surgical history that includes hx colonoscopy (2008); hx heent (06/19/2018); and hx other surgical (06/19/2018). Social History/Living Environment:     Lives in a private home with his wife, challenged with activities with reaching and overhead. Prior Level of Function/Work/Activity:  Works full time as a Technology , notes occasional discomfort in right shoulder, however most discomfort noted with his daily work outs at [employer], limited with how much weight and repetitions. He has been a weight  since the 80s and notes he has needed to significantly decrease the amount of weight he uses. Dominant Side:         RIGHT  Current Medications:       Current Outpatient Medications:     CHARCOAL, by Does Not Apply route., Disp: , Rfl:     OTHER, Indications: Cholrophyll, Disp: , Rfl:     ibuprofen (MOTRIN) 200 mg tablet, Take  by mouth., Disp: , Rfl:     glucosamine (GLUCOSAMINE RELIEF) 1,000 mg tab, Take  by mouth., Disp: , Rfl:     aspirin delayed-release 81 mg tablet, Take  by mouth daily. , Disp: , Rfl:    Date Last Reviewed:  10/18/2018     Number of Personal Factors/Comorbidities that affect the Plan of Care: 1-2: MODERATE COMPLEXITY   EXAMINATION:   Observation/Orthostatic Postural Assessment:           Shoulder symmetry exhibits right elevated compared to left. Shoulder girdles are bilateral protracted, right greater than left.  Scapular position is right elevated. Clavicles are right elevated. Soft tissue observations indicates restrictions in right greater than left upper trapezius, levator scapula, pectoralis, latissimus dorsi, subscapularis, infraspinatus and thoracic spine paraspinals. Improving 10/18/18 Patient exhibits a neutral cervical lordosis,  increased thoracic kyphosis with convex right type I curve in mid-thoracic spine, and neutral lumbar lordosis. Lower quadrant bony landmarks are left iliac crest elevated slightly. Palpation:  Myofascial assessment indicates restrictions in anterior chest. Muscle length testing levator scapulae with ipsilateral arm abduction is moderate restrictions right. Upper trapezius length is moderate restrictions bilaterally. Improving 10/18/18 Pectoralis minor/major and latissimus dorsi length is restricted right greater than left. Improving 10/18/18  Scalene muscle length is restricted right greater than left. Improving 10/18/18  Recent incision and scar tissue from melanoma removal on right lateral neck. ROM: Gross active cervical spine rotation is 85% available bilaterally. Apley's scratch test for functional ER/IR is right ER to C7, IR to L5 on right, left WFL. Passive Accessory Motion: Glenohumeral mobility is limited for right inferior glides  Improving 10/18/18. Acromioclavicular mobility is moderate restrictions right. Improving 10/18/18 Sternoclavicular mobility is moderate restrictions right. Improving 10/18/18 Scapulothoracic mobility is poor neuromuscular control of shoulder girdle position, challenged with posterior depression.   Improving 10/18/18  AROM(PROM) in degrees Right Left   Shoulder flexion 0-150 0-160   Shoulder abduction (palm down) 0-95 0-100   Shoulder extension 0-20 0-20   Shoulder internal rotation (IR)  (measured at 45 degrees of abduction) 0-20 0-30   ER (measured at 90 degrees of abduction) 0-50 0-50     Strength:   Manual Muscle Test (*/5) Right Left   Shoulder flexion 4 5 Shoulder extension 4 5   Shoulder abduction 4- 5   Shoulder adduction 4 5   Shoulder ER 4 5   Shoulder IR 4 5   Upper trapezius 5 5   Middle trapezius 4 4+   Lower trapezius 4 4+   Rhomboids 4 4+   Serratus Anterior 4 4+   Elbow flexion 5 5   Elbow extension 5 5        Special Tests:    Impingement tests performed on the right shoulder:  Lemons-Jose R test: negative. Neer test: negative. Stability tests performed on the right shoulder:  Sulcus sign: negative. Apprehension test: positive. Neurological Screen:  Myotomes: Key muscle strength testing for bilateral UE is intact. Dermatomes: Sensation testing through bilateral UE for light touch is intact. Reflexes: Biceps (C5), brachioradialis (C6), and triceps (C7) are not tested today. Neural tension tests: Upper limb tension test A is negative for exacerbation of patient's symptoms. Functional Mobility:  Challenged with overhead activities, reaching forward, reaching behind his back. Body Structures Involved:  1. Bones  2. Joints  3. Muscles Body Functions Affected:  1. Sensory/Pain  2. Neuromusculoskeletal  3. Movement Related Activities and Participation Affected:  1. General Tasks and Demands  2. Mobility  3. Self Care  4. Community, Social and [place]   Number of elements (examined above) that affect the Plan of Care: 3: MODERATE COMPLEXITY   CLINICAL PRESENTATION:   Presentation: Evolving clinical presentation with changing clinical characteristics: MODERATE COMPLEXITY   CLINICAL DECISION MAKING:   Outcome Measure: Tool Used: Disabilities of the Arm, Shoulder and Hand (DASH) Questionnaire - Quick Version  Score:  Initial: 25/55  Most Recent: X/55 (Date: -- )   Interpretation of Score: The DASH is designed to measure the activities of daily living in person's with upper extremity dysfunction or pain. Each section is scored on a 1-5 scale, 5 representing the greatest disability.   The scores of each section are added together for a total score of 55. Medical Necessity:   · Patient is expected to demonstrate progress in strength, range of motion, balance, coordination and functional technique to increase independence with ADLs, weight lifting techniques, use of his right shoulder girdle without discomfort. .  Reason for Services/Other Comments:  · Patient continues to require skilled intervention due to challenged with use of his right shoulder girdle with daily activities. .   Use of outcome tool(s) and clinical judgement create a POC that gives a: Questionable prediction of patient's progress: MODERATE COMPLEXITY            TREATMENT:   (In addition to Assessment/Re-Assessment sessions the following treatments were rendered)  Pre-treatment Symptoms/Complaints:  Patient notes overall notes improvements, however continues to be challenged with increased activities and end range of all directions of right shoulder. Pain levels continue to vary depending on activity levels. He has a scheduled colonoscopy this week and notes increased discomfort in right shoulder secondary to not being able to take his medication and glucosamine. Pain: Initial:   Pain Intensity 1: 3  Pain Location 1: Shoulder  Pain Orientation 1: Right  Post Session:  1/10     Therapeutic Exercise: (30 Minutes):  Exercises per grid below to improve mobility, strength, balance and coordination. Required minimal verbal and manual cues to promote proper body alignment, promote proper body posture, promote proper body mechanics and promote proper body breathing techniques. Progressed resistance, range, repetitions and complexity of movement as indicated.    Date:  10/18/2018     Activity/Exercise Parameters   Review posture X 5 minutes review   Supine serratus punch    Supine abdominal brace    Patient education    Seated scapular retraction    Seated shoulder elevation with inferior glides    Supine shoulder ER    Side lying shoulder arm swings    Prone  Y, T, I Review bilateral I 1/2 prone scapular stabilization with shoulder ER X 1 minute hold x 3 reps  X 5 minute education of correct techniques   Diaphragmatic breathing    Supine isometric activation of GH joint X 10 reps 5 sec holds   Latissimus stretches X 5 minute review in quadriped and sitting. Review of pectoralis stretch on foam roller and standing. Thoracic spine extension X 2 minute foam roller  X 2 minutes arch/sag   Spinal rotation/multifidi X 10 reps BLE     Manual Therapy (    Soft Tissue Mobilization Duration  Duration: 30 Minutes): Manual techniques to facilitate improved motion and decreased pain. (Used abbreviations: MET - muscle energy technique; PNF - proprioceptive neuromuscular facilitation; NMR - neuromuscular re-education; a/p - anterior to posterior; p/a - posterior to anterior, FMP - functional movement pattern)   · Supine anterior chest soft tissue mobilization with breathing techniques  · Supine right pectoralis major/minor soft tissue mobilization  · Supine right subscapularis and latissimus dorsi soft tissue mobilization with FMP of shoulder elevation  · Supine right GH joint mobilizations, inferior glides  · Supine right GH joint PROM into ER at 90 degrees abduction, followed with prolonged holds and NMR   · Side lying left for right PNF scapular patterns with prolonged holds into posterior depression. · Side lying accessory and function right costal cage mobility with resisted breathing  · Prone soft tissue mobilization along groove of spine of thoracic spine  · Side lying left for right latissimus dorsi soft tissue mobilization and distraction of superficial fascia  · With written consent received and precautions reviewed, instrument-assisted soft tissue mobilization was performed to right latissimus dorsi, infraspinatus and teres minor for the purpose of trigger point release with a positive treatment effect and no complications noted.         [facility]  Treatment/Session Assessment: · Response to Treatment: continues to improve ROM in right shoulder and decreased restrictions in right shoulder soft tissue, focus on thoracic spine extension this session with improved mobility noted at end of session. · Compliance with Program/Exercises: compliant all of the time. · Recommendations/Intent for next treatment session: \"Next visit will focus on advancements to more challenging activities\". Will work independently for 1 month.    Total Treatment Duration: 60 minutes  PT Patient Time In/Time Out  Time In: 0730  Time Out: 1420 [facility] Rocky Lyman PT

## 2018-10-21 ENCOUNTER — ANESTHESIA EVENT (OUTPATIENT)
Dept: ENDOSCOPY | Age: 58
End: 2018-10-21
Payer: COMMERCIAL

## 2018-10-22 ENCOUNTER — HOSPITAL ENCOUNTER (OUTPATIENT)
Age: 58
Setting detail: OUTPATIENT SURGERY
Discharge: HOME | End: 2018-10-22
Attending: SURGERY | Admitting: SURGERY
Payer: COMMERCIAL

## 2018-10-22 ENCOUNTER — ANESTHESIA (OUTPATIENT)
Dept: ENDOSCOPY | Age: 58
End: 2018-10-22
Payer: COMMERCIAL

## 2018-10-22 VITALS
OXYGEN SATURATION: 97 % | BODY MASS INDEX: 30.52 KG/M2 | HEART RATE: 48 BPM | RESPIRATION RATE: 18 BRPM | SYSTOLIC BLOOD PRESSURE: 130 MMHG | DIASTOLIC BLOOD PRESSURE: 79 MMHG | HEIGHT: 71 IN | TEMPERATURE: 97.6 F | WEIGHT: 218 LBS

## 2018-10-22 PROCEDURE — 74011250636 HC RX REV CODE- 250/636: Performed by: ANESTHESIOLOGY

## 2018-10-22 PROCEDURE — 76040000025: Performed by: SURGERY

## 2018-10-22 PROCEDURE — 74011250636 HC RX REV CODE- 250/636

## 2018-10-22 PROCEDURE — 76060000032 HC ANESTHESIA 0.5 TO 1 HR: Performed by: SURGERY

## 2018-10-22 RX ORDER — SODIUM CHLORIDE 0.9 % (FLUSH) 0.9 %
5-10 SYRINGE (ML) INJECTION AS NEEDED
Status: CANCELLED | OUTPATIENT
Start: 2018-10-22

## 2018-10-22 RX ORDER — SODIUM CHLORIDE, SODIUM LACTATE, POTASSIUM CHLORIDE, CALCIUM CHLORIDE 600; 310; 30; 20 MG/100ML; MG/100ML; MG/100ML; MG/100ML
100 INJECTION, SOLUTION INTRAVENOUS CONTINUOUS
Status: DISCONTINUED | OUTPATIENT
Start: 2018-10-22 | End: 2018-10-22 | Stop reason: HOSPADM

## 2018-10-22 RX ORDER — PROPOFOL 10 MG/ML
INJECTION, EMULSION INTRAVENOUS AS NEEDED
Status: DISCONTINUED | OUTPATIENT
Start: 2018-10-22 | End: 2018-10-22 | Stop reason: HOSPADM

## 2018-10-22 RX ORDER — SODIUM CHLORIDE, SODIUM LACTATE, POTASSIUM CHLORIDE, CALCIUM CHLORIDE 600; 310; 30; 20 MG/100ML; MG/100ML; MG/100ML; MG/100ML
25 INJECTION, SOLUTION INTRAVENOUS CONTINUOUS
Status: CANCELLED | OUTPATIENT
Start: 2018-10-22 | End: 2018-10-23

## 2018-10-22 RX ORDER — PROPOFOL 10 MG/ML
INJECTION, EMULSION INTRAVENOUS
Status: DISCONTINUED | OUTPATIENT
Start: 2018-10-22 | End: 2018-10-22 | Stop reason: HOSPADM

## 2018-10-22 RX ADMIN — SODIUM CHLORIDE, SODIUM LACTATE, POTASSIUM CHLORIDE, AND CALCIUM CHLORIDE 100 ML/HR: 600; 310; 30; 20 INJECTION, SOLUTION INTRAVENOUS at 08:07

## 2018-10-22 RX ADMIN — PROPOFOL 200 MCG/KG/MIN: 10 INJECTION, EMULSION INTRAVENOUS at 08:50

## 2018-10-22 RX ADMIN — PROPOFOL 70 MG: 10 INJECTION, EMULSION INTRAVENOUS at 08:50

## 2018-10-22 NOTE — H&P
History and Physical 
 
Patient: Celina Kate Physician: Ankita Castanon MD 
 
Referring Physician: Monserrat Del Valle MD 
 
Chief Complaint: For colonoscopy History of Present Illness: Pt presents for colonoscopy. Had prior exam 2008. History: 
Past Medical History:  
Diagnosis Date  Cancer ([address] 06/19/2018  
 melanoma of neck  Cervicalgia  Pain in joint, ankle and foot  Pain in joint, shoulder region 1/24/2014  Routine general medical examination at a health care facility  Screening for lipoid disorders  Special screening for malignant neoplasm of prostate  Unspecified deformity of finger Past Surgical History:  
Procedure Laterality Date  HX COLONOSCOPY  2008  HX HEENT  06/19/2018  
 melanoma of neck excised  HX OTHER SURGICAL  06/19/2018 Patient had Melanoma removed right side of neck Social History Socioeconomic History  Marital status:  Spouse name: Not on file  Number of children: Not on file  Years of education: Not on file  Highest education level: Not on file Social Needs  Financial resource strain: Not on file  Food insecurity - worry: Not on file  Food insecurity - inability: Not on file  Transportation needs - medical: Not on file  Transportation needs - non-medical: Not on file Occupational History  Not on file Tobacco Use  Smoking status: Former Smoker  Smokeless tobacco: Never Used Substance and Sexual Activity  Alcohol use: No  
 Drug use: No  
 Sexual activity: Yes  
  Partners: Female Other Topics Concern  Not on file Social History Narrative  Not on file Family History Problem Relation Age of Onset  Thyroid Disease Mother  Cancer Mother   
     breast  
 Diabetes Mother  Heart Disease Father  Cancer Maternal Grandmother   
     breast  
 Heart Disease Paternal Grandfather Medications:  
Prior to Admission medications Medication Sig Start Date End Date Taking? Authorizing Provider CHARCOAL by Does Not Apply route. Yes Provider, Historical  
OTHER Indications: Cholrophyll   Yes Provider, Historical  
ibuprofen (MOTRIN) 200 mg tablet Take  by mouth. Provider, Historical  
glucosamine (GLUCOSAMINE RELIEF) 1,000 mg tab Take  by mouth. Provider, Historical  
aspirin delayed-release 81 mg tablet Take  by mouth daily. Provider, Historical  
 
 
Allergies: No Known Allergies Physical Exam:  
 
Vital Signs:  
Visit Vitals /73 Pulse (!) 46 Temp 98 °F (36.7 °C) Resp 16 Ht 5' 11\" (1.803 m) Wt 218 lb (98.9 kg) SpO2 97% BMI 30.40 kg/m² Willis Ko General: no distress Heart: regular Lungs: unlabored Abdominal: soft Neurological: Grossly normal  
  
 
Findings/Diagnosis: Screening for colorectal cancer Plan of Care/Planned Procedure: Colonoscopy, possible polypectomy. Pt/designee agree to proceed. Signed: 
Ed Bentley MD 
 10/22/2018

## 2018-10-22 NOTE — DISCHARGE INSTRUCTIONS
Gastrointestinal Colonoscopy/Flexible Sigmoidoscopy - Lower Exam Discharge Instructions  1. Call Dr. Erendira Rogers at [phone] for any problems or questions. 2. Contact the doctors office for follow up appointment as directed  3. Medication may cause drowsiness for several hours, therefore, do not drive or operate machinery for remainder of the day. 4. No alcohol today. 5. Ordinarily, you may resume regular diet and activity after exam unless otherwise specified by your physician. 6. Because of air put into your colon during exam, you may experience some abdominal distension, relieved by the passage of gas, for several hours. 7. Contact your physician if you have any of the following:  a. Excessive amount of bleeding - large amount when having a bowel movement. Occasional specks of blood with bowel movement would not be unusual.  b. Severe abdominal pain  c. Fever or Chills  Any additional instructions:  Recommend next colonoscopy in 10 years, office will follow up with next visit      Instructions given to [employer] and other family members.   Instructions given by:

## 2018-10-22 NOTE — ANESTHESIA PREPROCEDURE EVALUATION
Anesthetic History Review of Systems / Medical History Patient summary reviewed and pertinent labs reviewed Pulmonary Neuro/Psych Cardiovascular Hypertension Exercise tolerance: >4 METS 
  
GI/Hepatic/Renal 
  
 
 
 
 
 
 Endo/Other Other Findings Physical Exam 
 
Airway Mallampati: II 
TM Distance: > 6 cm Neck ROM: normal range of motion Cardiovascular Regular rate and rhythm,  S1 and S2 normal,  no murmur, click, rub, or gallop Rate: normal 
 
 
 
 Dental 
No notable dental hx Pulmonary Breath sounds clear to auscultation Abdominal 
 
 
 
 Other Findings Anesthetic Plan ASA: 3 Anesthesia type: total IV anesthesia Anesthetic plan and risks discussed with: Patient

## 2018-10-22 NOTE — ANESTHESIA POSTPROCEDURE EVALUATION
Visit Vitals /73 Pulse (!) 46 Temp 36.7 °C (98 °F) Resp 16 Ht 5' 11\" (1.803 m) Wt 98.9 kg (218 lb) SpO2 97% BMI 30.40 kg/m²  
, pain well controlled, airway patent, patient appropriately hydrated and appears euvolemic, Alert and oriented,  no nausea,  follow up per surgeon, no anesthetic complications, Discharge to home. Procedure(s): 
COLONOSCOPY  BMI 33. 
 
<BSHSIANPOST> Visit Vitals /73 Pulse (!) 46 Temp 36.7 °C (98 °F) Resp 16 Ht 5' 11\" (1.803 m) Wt 98.9 kg (218 lb) SpO2 97% BMI 30.40 kg/m²

## 2018-10-22 NOTE — PROCEDURES
Procedure in Detail:  Informed consent was obtained for the procedure. The patient was placed in the left lateral decubitus position and sedation was induced by anesthesia. The [id number] was inserted into the rectum and advanced under direct vision to the cecum, which was identified by the appendiceal orifice. The quality of the colonic preparation was fair. A careful inspection was made as the colonoscope was withdrawn, including a retroflexed view of the rectum; findings and interventions are described below. Appropriate photodocumentation was obtained. Findings:   Rectum:   Normal  Sigmoid:     - Excavated lesions:     - Diverticulosis  Descending Colon:   Normal  Transverse Colon:   Normal  Ascending Colon:   Normal  Cecum:   Normal          Specimens: No specimens were collected. Complications: None; patient tolerated the procedure well. \    EBL - none    Recommendations:   - For colon cancer screening in this average-risk patient, colonoscopy may be repeated in 10 years.      Signed By: Lee Flower MD                        October 22, 2018

## 2021-06-23 ENCOUNTER — APPOINTMENT (OUTPATIENT)
Dept: PHYSICAL THERAPY | Age: 61
End: 2021-06-23
Payer: COMMERCIAL

## 2021-06-29 ENCOUNTER — HOSPITAL ENCOUNTER (OUTPATIENT)
Dept: PHYSICAL THERAPY | Age: 61
Discharge: HOME | End: 2021-06-29
Payer: COMMERCIAL

## 2021-06-29 PROCEDURE — 97110 THERAPEUTIC EXERCISES: CPT

## 2021-06-29 PROCEDURE — 97162 PT EVAL MOD COMPLEX 30 MIN: CPT

## 2021-06-30 NOTE — THERAPY EVALUATION
Marisel Butterfield  : 1960  Primary: Major Analy Of Silvia Garcia*  Secondary:  Therapy Center at [address]  [phone]   [phone]        OUTPATIENT PHYSICAL THERAPY:Initial Assessment 2021   ICD-10: Treatment Diagnosis: pain in joint, right shoulder M25.511  ICD-10: Treatment Diagnosis: pain in joint, left shoulder M25.512  ICD-10: Treatment Diagnosis: stiffness in joint, right shoulder M25.611  ICD-10: Treatment Diagnosis: stiffness in joint, left shoulder M25.612  Precautions/Allergies:   Patient has no known allergies. TREATMENT PLAN:  Effective Dates: 2021 TO 2021 (90 days). Frequency/Duration: 1 time a week for 90 Day(s) MEDICAL/REFERRING DIAGNOSIS:  Pain in right shoulder [M25.511]  Pain in left shoulder [M25.512]   DATE OF ONSET: chronic, recent flare up  REFERRING PHYSICIAN: Camila Knight MD MD Orders: evaluate and treat  Return MD Appointment: as needed     INITIAL ASSESSMENT:  Mr. Nathan Ruiz is a 59 y.o. male who presents to physical therapy with chronic bilateral shoulder pain and stiffness. He is limited in ROM of bilateral shoulders in multiple directions. He presents with significant imbalances between his pectoralis major/minor and biceps compared to his posterior scapular musculature. This imbalance has created deficits in his mobility of his shoulder girdles bilaterally. He would benefit from skilled physical therapy to improve overall mobility of bilateral shoulder girdles, increase ROM of bilateral shoulder glenohumeral joints, decreased soft tissue restrictions and improve balance in UQ musculature. PROBLEM LIST (Impacting functional limitations):  1. Decreased Strength  2. Decreased ADL/Functional Activities  3. Increased Pain  4. Decreased Activity Tolerance  5. Decreased Flexibility/Joint Mobility INTERVENTIONS PLANNED: (Treatment may consist of any combination of the following)  1.  Home Exercise Program (HEP)  2. Manual Therapy  3. Neuromuscular Re-education/Strengthening  4. Range of Motion (ROM)  5. Therapeutic Activites  6. Therapeutic Exercise/Strengthening     GOALS: (Goals have been discussed and agreed upon with patient.)    Discharge Goals: Time Frame: 90 days  1. Patient demonstrates independence with his HEP without verbal cueing. 2. Patient demonstrate full functional ROM to bilateral shoulder for ability to perform overhead activity with pain no more than 0-2/10. 3. Patient able to sleep for 6 consecutive hours without awoke from bilateral shoulder pain. 4. Improve DASH outcome measure score from 37/55 to 20/55 to perform ADLs    OUTCOME MEASURE:   Tool Used: Disabilities of the Arm, Shoulder and Hand (DASH) Questionnaire - Quick Version  Score:  Initial: 37/55  Most Recent: X/55 (Date: -- )   Interpretation of Score: The DASH is designed to measure the activities of daily living in person's with upper extremity dysfunction or pain. Each section is scored on a 1-5 scale, 5 representing the greatest disability. The scores of each section are added together for a total score of 55. MEDICAL NECESSITY:   · Patient is expected to demonstrate progress in strength, range of motion, balance, coordination and functional technique to increase independence with increasing ROM in bilateral shoulder for ability to perform ADLs with improved mobility and decreased pain. REASON FOR SERVICES/OTHER COMMENTS:  · Patient continues to require skilled intervention due to limited ROM in bilateral shoulders preventing him to be able to complete functional daily tasks. .  Total Duration:  PT Patient Time In/Time Out  Time In: 1630  Time Out: 1730    Rehabilitation Potential For Stated Goals: Excellent  Regarding Pritesh Watkins's therapy, I certify that the treatment plan above will be carried out by a therapist or under their direction.   Thank you for this referral,  Paul Javier, PT Referring Physician Signature: Jame Sarmiento MD                  PAIN/SUBJECTIVE:   Initial: Pain Intensity 1: 8  Pain Location 1: Shoulder  Pain Orientation 1: Anterior, Left, Right  Post Session:  7/10   HISTORY:   History of Injury/Illness (Reason for Referral):  Chronic history of right shoulder pain, however recently has noted increased discomfort in bilateral shoulders and limited ability to perform ROM in shoulders, left greater than right. He has stopped going to the gym for a short duration during [place] and now has returned. Notes he is limited with some of his gym exercises secondary to pain and limited mobility. He also notes discomfort in bilateral shoulders after prolonged sitting and then initial movement after. He also note discomfort with sleeping positions and pain into bilateral shoulders. He recently had an anti-inflammatory and steroid injection and noted 90% improvement in his ROM and pain levels. However, this relief only lasted for about a week. He notes his shoulders feel more like they \"lock up\" and symptoms are slowly progressing. He takes ibuprofen each night, notes if he didn't take it, symptoms would be worse, helps with pain, however does not help with stiffness. Notes clunking in bilateral shoulders. Works out daily and has a routine for different muscle groups each day. Patient denies dizziness, drop attacks, numbness/tingling, bowel/bladder dysfunction and/or unexplained weight gain/loss. Past Medical History/Comorbidities:   Mr. Angie Valverde  has a past medical history of Cancer ([facility] [address] (06/19/2018), Cervicalgia, Pain in joint, ankle and foot, Pain in joint, shoulder region (1/24/2014), Routine general medical examination at a health care facility, Screening for lipoid disorders, Special screening for malignant neoplasm of prostate, and Unspecified deformity of finger.  He also has no past medical history of Difficult intubation, Malignant hyperthermia due to anesthesia, Nausea & vomiting, or Pseudocholinesterase deficiency. Mr. Darren Castañeda  has a past surgical history that includes hx colonoscopy (2008); hx heent (06/19/2018); hx other surgical (06/19/2018); pr colonoscopy flx dx w/collj spec when pfrmd (10/22/2018); and colonoscopy (N/A, 10/22/2018). Social History/Living Environment:     Lives in a private home with his wife, challenged with overhead and reaching activities secondary to pain and stiffness  Prior Level of Function/Work/Activity:  Independent, limited with daily activities at home, ability to participate in hobbies like hunting/shooting secondary to limited ROM. Dominant Side:         RIGHT   Ambulatory/Rehab Services H2 Model Falls Risk Assessment   Risk Factors:       (1)  Gender [Male] Ability to Rise from Chair:       (0)  Ability to rise in a single movement   Falls Prevention Plan:       No modifications necessary   Total: (5 or greater = High Risk): 1   ©2010 [facility] [place]. All Rights Reserved. [facility] Patent #[id number]. Federal Law prohibits the replication, distribution or use without written permission from [facility] [employer]   Current Medications:       Current Outpatient Medications:     CHARCOAL, by Does Not Apply route., Disp: , Rfl:     OTHER, Indications: Cholrophyll, Disp: , Rfl:     ibuprofen (MOTRIN) 200 mg tablet, Take  by mouth., Disp: , Rfl:     glucosamine (GLUCOSAMINE RELIEF) 1,000 mg tab, Take  by mouth., Disp: , Rfl:    Date Last Reviewed:  6/29/2021     Number of Personal Factors/Comorbidities that affect the Plan of Care: 1-2: MODERATE COMPLEXITY   EXAMINATION:   Observation/Orthostatic Postural Assessment:          Shoulder symmetry exhibits bilateral protraction. Shoulder girdles are right elevated compared to left. Scapular position is right elevated compared to left. Clavicles are right elevated compared. Soft tissue observations indicates muscle imbalances between anterior and posterior musculature.  Significant restrictions noted in bilateral pectoralis major and minor, biceps. Moderate restrictions in bilateral latissimus dorsi, periscapular musculature. Patient exhibits a decreased cervical lordosis,  neutral thoracic kyphosis, elevated sternum and elevated costal cage with thoracic spine extension. .    Palpation:  Myofascial assessment indicates restrictions in anterior chest. Muscle length testing levator scapulae with ipsilateral arm abduction is moderate restrictions left. Upper trapezius length is mild restrictions bilaterally. Pectoralis minor/major and latissimus dorsi length is restrictions bilaterally. Scalene muscle length is mild restrictions bilaterally. ROM: Gross active cervical spine rotation is 80% available bilaterally. Apley's scratch test for functional ER/IR is restricted bilaterally: left base of skull/side torso, right: base of skull, side torso . Passive Accessory Motion: Glenohumeral mobility is restricted bilaterally for inferior glide. Acromioclavicular mobility is decreased posterior glides. Sternoclavicular mobility is decreased a/p glide. Scapulothoracic mobility is restricted bilateral posterior depression. Presents with anterior tilt of costal cage with thoracic extension. AROM(PROM) in degrees Right Left   Shoulder flexion 0-85 0-80   Shoulder abduction (palm down) 0-75 0-75   Shoulder extension 0-10 0-10   Shoulder internal rotation (IR)  (measured at 45 degrees of abduction) 0-10 0-10   ER (measured at 45 degrees of abduction) 0-15 0-10     Strength:   Manual Muscle Test (*/5) Right Left   Shoulder flexion 4 4   Shoulder extension 4 4   Shoulder abduction 4 4   Shoulder adduction 4 4   Shoulder ER 4 4   Shoulder IR 4 4   Upper trapezius 5 5   Middle trapezius 4 4   Lower trapezius 4 4   Rhomboids 4 4   Serratus Anterior 4 4   Elbow flexion 5 5   Elbow extension 4 4   Special Tests:    Impingement tests performed on the bilaterally shoulder:  Lemons-Jose R test: positive.   Sandrine test: not tested. Stability tests performed on the bilaterally shoulder:  Sulcus sign: negative. Neurological Screen:  Myotomes: Key muscle strength testing for bilateral UE is intact. Dermatomes: Sensation testing through bilateral UE for light touch is intact. Reflexes: Biceps (C5), brachioradialis (C6), and triceps (C7) are 2+ and WFL. Neural tension tests: Upper limb tension test A is negative. Functional Mobility:  Challenged with overhead and reaching, challenged with sleeping positions and sitting positions. Body Structures Involved:  1. Thoracic Cage  2. Bones  3. Joints  4. Muscles Body Functions Affected:  1. Sensory/Pain  2. Neuromusculoskeletal  3. Movement Related Activities and Participation Affected:  1. General Tasks and Demands  2. Mobility  3. Self Care  4.  Community, Social and [place]   Number of elements (examined above) that affect the Plan of Care: 3: MODERATE COMPLEXITY   CLINICAL PRESENTATION:   Presentation: Evolving clinical presentation with changing clinical characteristics: MODERATE COMPLEXITY   CLINICAL DECISION MAKING:   Use of outcome tool(s) and clinical judgement create a POC that gives a: Questionable prediction of patient's progress: MODERATE COMPLEXITY

## 2021-06-30 NOTE — PROGRESS NOTES
Edie Mullins  : 1960  Primary: Darnell Phan Of [facility]*  Secondary:  Therapy Center at [address]  [phone]   [phone]        OUTPATIENT PHYSICAL THERAPY: Daily Treatment Note 2021  Visit Count:  1    ICD-10: Treatment Diagnosis: pain in joint, right shoulder M25.511  ICD-10: Treatment Diagnosis: pain in joint, left shoulder M25.512  ICD-10: Treatment Diagnosis: stiffness in joint, right shoulder M25.611  ICD-10: Treatment Diagnosis: stiffness in joint, left shoulder M25.612  Precautions/Allergies:   Patient has no known allergies. TREATMENT PLAN:  Effective Dates: 2021 TO 2021 (90 days). Frequency/Duration: 1 time a week for 90 Day(s) MEDICAL/REFERRING DIAGNOSIS:  Pain in right shoulder [M25.511]  Pain in left shoulder [M25.512]   DATE OF ONSET: chronic, recent flare up  REFERRING PHYSICIAN: Radha Taylor MD MD Orders: evaluate and treat  Return MD Appointment: as needed     Pre-treatment Symptoms/Complaints:  Patient notes challenged with reaching and overhead activities due to pain and stiffness in bilateral shoulders. Notes since recent injection his pain levels have returned to 70-80%  Pain: Initial: Pain Intensity 1: 8  Pain Location 1: Shoulder  Pain Orientation 1: Anterior, Left, Right /10 Post Session:  7/10   Medications Last Reviewed:  2021  Updated Objective Findings:  See evaluation note from today  TREATMENT:     THERAPEUTIC EXERCISE: (15 minutes):  Exercises per grid below to improve mobility, strength, balance and coordination. Required moderate visual, verbal, manual and tactile cues to promote proper body alignment, promote proper body posture, promote proper body mechanics and promote proper body breathing techniques. Progressed resistance, range, repetitions and complexity of movement as indicated.    Date:  2021   Activity/Exercise Parameters   Review gym exercise routine X 10 minutes, review modifications, less weight with exercises, balance between anterior and posterior exercises. Pendulums X 10 reps, clockwise counter clockwise   Isometric shoulder ER X 10 reps, 5 sec holds, green band                       MANUAL THERAPY: (5 minutes): Joint mobilization and Soft tissue mobilization was utilized and necessary because of the patient's restricted joint motion, painful spasm, loss of articular motion and restricted motion of soft tissue. (Used abbreviations: MET - muscle energy technique; PNF - proprioceptive neuromuscular facilitation; NMR - neuromuscular re-education; a/p - anterior to posterior; p/a - posterior to anterior, FMP - functional movement patterns, SF - Superficial Fascia; BC - Bony contours; MP - muscle play; IASTM - instrument-assisted soft tissue mobilization)  · Supine soft tissue mobilization to anterior chest with breathing techniques     [facility]  Treatment/Session Summary:    · Response to Treatment:  presents with significant imbalance and tightness of anterior chest and pectoralis musculature, presents with excessive thoracic spine extension and elevated costal cage with standing posture activities. .  · Communication/Consultation:  None today  · Equipment provided today:  None today  · Recommendations/Intent for next treatment session: Next visit will focus on soft tissue mobilization of anterior chest and shoulders, strengthening exercises for postural stability, postural training for costal cage positions.     Total Treatment Billable Duration:  20 minutes treatment, 40 minutes evaluation  PT Patient Time In/Time Out  Time In: 1630  Time Out: ELI Lynn    Future Appointments   Date Time Provider Kaushal Beasley   7/12/2021  3:30 PM Artem Dejesus, ELI [facility]   7/19/2021  4:30 PM Esequiel MAHAJAN, PT [facility]   7/27/2021  3:30 PM Esequiel MAHAJAN, PT OFF [facility]   9/24/2021  7:30 AM F LAB RESOURCE [facility]   9/29/2021  9:00 AM MD [facility]

## 2021-07-12 ENCOUNTER — HOSPITAL ENCOUNTER (OUTPATIENT)
Dept: PHYSICAL THERAPY | Age: 61
Discharge: HOME | End: 2021-07-12
Attending: FAMILY MEDICINE
Payer: COMMERCIAL

## 2021-07-12 DIAGNOSIS — M25.511 BILATERAL SHOULDER PAIN, UNSPECIFIED CHRONICITY: ICD-10-CM

## 2021-07-12 DIAGNOSIS — M25.512 BILATERAL SHOULDER PAIN, UNSPECIFIED CHRONICITY: ICD-10-CM

## 2021-07-12 PROCEDURE — 97110 THERAPEUTIC EXERCISES: CPT

## 2021-07-12 PROCEDURE — 97140 MANUAL THERAPY 1/> REGIONS: CPT

## 2021-07-13 NOTE — PROGRESS NOTES
Sarah Bloodgood  : 1960  Primary: Hermes Wilma Of [facility] Prema*  Secondary:  Therapy Center at [address]  [phone]   [phone]        OUTPATIENT PHYSICAL THERAPY: Daily Treatment Note 2021  Visit Count:  2    ICD-10: Treatment Diagnosis: pain in joint, right shoulder M25.511  ICD-10: Treatment Diagnosis: pain in joint, left shoulder M25.512  ICD-10: Treatment Diagnosis: stiffness in joint, right shoulder M25.611  ICD-10: Treatment Diagnosis: stiffness in joint, left shoulder M25.612  Precautions/Allergies:   Patient has no known allergies. TREATMENT PLAN:  Effective Dates: 2021 TO 2021 (90 days). Frequency/Duration: 1 time a week for 90 Day(s) MEDICAL/REFERRING DIAGNOSIS:  Pain in right shoulder [M25.511]  Pain in left shoulder [M25.512]   DATE OF ONSET: chronic, recent flare up  REFERRING PHYSICIAN: Nahun Briggs MD MD Orders: evaluate and treat  Return MD Appointment: as needed     Pre-treatment Symptoms/Complaints:  Patient notes improvements after last week with ROM in bilateral anterior chest and slight decrease in pain levels. Pain: Initial: Pain Intensity 1: 6  Pain Location 1: Shoulder  Pain Orientation 1: Anterior, Left, Right /10 Post Session:  7/10   Medications Last Reviewed:  2021  Updated Objective Findings:  Soft tissue restrictions through bilateral pectoralis major/minor, bony contour restrictions around bilateral coracoid process. TREATMENT:     THERAPEUTIC EXERCISE: (25 minutes):  Exercises per grid below to improve mobility, strength, balance and coordination. Required moderate visual, verbal, manual and tactile cues to promote proper body alignment, promote proper body posture, promote proper body mechanics and promote proper body breathing techniques. Progressed resistance, range, repetitions and complexity of movement as indicated.    Date:  2021   Activity/Exercise Parameters   Review gym exercise routine X 10 minutes, review modifications, less weight with exercises, balance between anterior and posterior exercises. Pendulums X 10 reps, clockwise counter clockwise   Isometric shoulder ER X 10 reps, 5 sec holds, green band   Supine pectoralis stretch on foam roller X 3 reps, 1 minute holds with breathing   Planks X 5 minute review of techniques   Prone prop scapular stabilization X 5 minute education  X 3 reps, 25 sec holds with green t-band for stability. MANUAL THERAPY: (30 minutes): Joint mobilization and Soft tissue mobilization was utilized and necessary because of the patient's restricted joint motion, painful spasm, loss of articular motion and restricted motion of soft tissue. (Used abbreviations: MET - muscle energy technique; PNF - proprioceptive neuromuscular facilitation; NMR - neuromuscular re-education; a/p - anterior to posterior; p/a - posterior to anterior, FMP - functional movement patterns, SF - Superficial Fascia; BC - Bony contours; MP - muscle play; IASTM - instrument-assisted soft tissue mobilization)  · Supine soft tissue mobilization to anterior chest with breathing techniques   · Supine bilateral pectoralis major/minor soft tissue mobilization with breathing techniques and shoulder ER. · Supine bilateral cervical spine manual traction and suboccipital release. · Supine bilateral soft tissue mobilization around bony contours of coracoid process. [employer] Portal  Treatment/Session Summary:    · Response to Treatment:  improving overall mobility in anterior chest and bilateral pectoralis musculature. · Communication/Consultation:  None today  · Equipment provided today:  None today  · Recommendations/Intent for next treatment session: Next visit will focus on soft tissue mobilization of anterior chest and shoulders, strengthening exercises for postural stability, postural training for costal cage positions.     Total Treatment Billable Duration:  55 minute treatment   PT Patient Time In/Time Out  Time In: 1530  Time Out: R Shabbir Rosado, PT    Future Appointments   Date Time Provider Kaushal Manei   7/19/2021  4:30 PM Jonathan Castro, PT [facility]   7/27/2021  3:30 PM Antionette MAHAJAN, PT [facility]   9/24/2021  7:30 AM [facility] LAB RESOURCE [facility]   9/29/2021  9:00 AM Star Oquendo MD [facility]

## 2021-07-19 ENCOUNTER — HOSPITAL ENCOUNTER (OUTPATIENT)
Dept: PHYSICAL THERAPY | Age: 61
Discharge: HOME | End: 2021-07-19
Payer: COMMERCIAL

## 2021-07-19 PROCEDURE — 97140 MANUAL THERAPY 1/> REGIONS: CPT

## 2021-07-19 PROCEDURE — 97110 THERAPEUTIC EXERCISES: CPT

## 2021-07-20 NOTE — PROGRESS NOTES
Jessie Gutiérrez  : 1960  Primary: Monalisa Eucedaine Of [facility] Garcia*  Secondary:  [facility]rapy Center at [facility] [address] [facility]  [phone]   [phone]        OUTPATIENT PHYSICAL THERAPY: Daily Treatment Note 2021  Visit Count:  3    ICD-10: Treatment Diagnosis: pain in joint, right shoulder M25.511  ICD-10: Treatment Diagnosis: pain in joint, left shoulder M25.512  ICD-10: Treatment Diagnosis: stiffness in joint, right shoulder M25.611  ICD-10: Treatment Diagnosis: stiffness in joint, left shoulder M25.612  Precautions/Allergies:   Patient has no known allergies. TREATMENT PLAN:  Effective Dates: 2021 TO 2021 (90 days). Frequency/Duration: 1 time a week for 90 Day(s) MEDICAL/REFERRING DIAGNOSIS:  Pain in right shoulder [M25.511]  Pain in left shoulder [M25.512]   DATE OF ONSET: chronic, recent flare up  REFERRING PHYSICIAN: Star Oquendo MD MD Orders: evaluate and treat  Return MD Appointment: as needed     Pre-treatment Symptoms/Complaints:  Patient notes exercises are going well at home, noting improvements in his flexibility of pectoralis musculature bilaterally. Continues to note clunking in bilateral shoulders with planks. Pain: Initial: Pain Intensity 1: 5  Pain Location 1: Shoulder  Pain Orientation 1: Left, Right /10 Post Session:  4/10   Medications Last Reviewed:  2021  Updated Objective Findings:  Soft tissue restrictions through bilateral pectoralis major/minor, bony contour restrictions around bilateral coracoid process. TREATMENT:     THERAPEUTIC EXERCISE: (25 minutes):  Exercises per grid below to improve mobility, strength, balance and coordination. Required moderate visual, verbal, manual and tactile cues to promote proper body alignment, promote proper body posture, promote proper body mechanics and promote proper body breathing techniques.   Progressed resistance, range, repetitions and complexity of movement as indicated. Date:  7/19/2021   Activity/Exercise Parameters   Review gym exercise routine X 5 minutes, review modifications, less weight with exercises, balance between anterior and posterior exercises. Pendulums X 10 reps, clockwise counter clockwise   Isometric shoulder ER X 10 reps, 5 sec holds, green band   Supine pectoralis stretch on foam roller X 3 reps, 1 minute holds with breathing   Planks X 5 minute review of techniques   Prone prop scapular stabilization X 5 minute education  X 3 reps, 25 sec holds with green t-band for stability. Side lying circles X 5 reps clockwise and counterclockwise. MANUAL THERAPY: (30 minutes): Joint mobilization and Soft tissue mobilization was utilized and necessary because of the patient's restricted joint motion, painful spasm, loss of articular motion and restricted motion of soft tissue. (Used abbreviations: MET - muscle energy technique; PNF - proprioceptive neuromuscular facilitation; NMR - neuromuscular re-education; a/p - anterior to posterior; p/a - posterior to anterior, FMP - functional movement patterns, SF - Superficial Fascia; BC - Bony contours; MP - muscle play; IASTM - instrument-assisted soft tissue mobilization)  · Supine soft tissue mobilization to anterior chest with breathing techniques   · Supine bilateral pectoralis major/minor soft tissue mobilization with breathing techniques and shoulder ER. · Supine bilateral cervical spine manual traction and suboccipital release. · Supine bilateral soft tissue mobilization around bony contours of coracoid process. · Side lying right for left scapular posterior depression with prolonged holds. [facility] Portal  Treatment/Session Summary:    · Response to Treatment:  decreased soft tissue restrictions in bilateral pectoralis, challenged with bilateral scapular posterior depression.   · Communication/Consultation:  None today  · Equipment provided today:  None today  · Recommendations/Intent for next treatment session: Next visit will focus on soft tissue mobilization of anterior chest and shoulders, strengthening exercises for postural stability, postural training for costal cage positions.     Total Treatment Billable Duration:  55 minute treatment   PT Patient Time In/Time Out  Time In: 1630  Time Out: ELI Lynn    Future Appointments   Date Time Provider Kaushal Beasley   7/27/2021  3:30 PM Derril Danes., PT [facility]   8/2/2021  4:30 PM Gillapollo CRYSTAL., PT [facility]   8/11/2021  4:30 PM Derril Danes., PT [facility]   8/17/2021  3:30 PM Derril Danes., PT [facility]   8/26/2021  3:30 PM Derril Danes., PT [facility]   9/1/2021  4:30 PM Gillermina Samuel A., PT [facility]   9/24/2021  7:30 AM [facility] LAB RESOURCE [facility]   9/29/2021  9:00 AM Stefan Urbano MD [facility]

## 2021-07-27 ENCOUNTER — HOSPITAL ENCOUNTER (OUTPATIENT)
Dept: PHYSICAL THERAPY | Age: 61
Discharge: HOME | End: 2021-07-27
Payer: COMMERCIAL

## 2021-07-27 PROCEDURE — 97140 MANUAL THERAPY 1/> REGIONS: CPT

## 2021-07-27 PROCEDURE — 97110 THERAPEUTIC EXERCISES: CPT

## 2021-07-28 NOTE — PROGRESS NOTES
Ebony Murrell  : 1960  Primary: Severiano Harry [facility]*  Secondary:  Therapy Center at [address]  [phone]   [phone]        OUTPATIENT PHYSICAL THERAPY: Daily Treatment Note 2021  Visit Count:  4    ICD-10: Treatment Diagnosis: pain in joint, right shoulder M25.511  ICD-10: Treatment Diagnosis: pain in joint, left shoulder M25.512  ICD-10: Treatment Diagnosis: stiffness in joint, right shoulder M25.611  ICD-10: Treatment Diagnosis: stiffness in joint, left shoulder M25.612  Precautions/Allergies:   Patient has no known allergies. TREATMENT PLAN:  Effective Dates: 2021 TO 2021 (90 days). Frequency/Duration: 1 time a week for 90 Day(s) MEDICAL/REFERRING DIAGNOSIS:  Pain in right shoulder [M25.511]  Pain in left shoulder [M25.512]   DATE OF ONSET: chronic, recent flare up  REFERRING PHYSICIAN: Rose Dash MD MD Orders: evaluate and treat  Return MD Appointment: as needed     Pre-treatment Symptoms/Complaints:  Patient noting increased ROM in bilateral shoulder flexion and improving flexibility in bilateral pectoralis. Notes challenged with sitting in straight back chair at home and notes increased stiffness after sitting for 45 minutes. Pain: Initial: Pain Intensity 1: 4  Pain Location 1: Shoulder  Pain Orientation 1: Left, Right /10 Post Session:  3/10   Medications Last Reviewed:  2021  Updated Objective Findings:  Soft tissue restrictions through bilateral pectoralis major/minor, latissimus dorsi, bony contour restrictions around bilateral coracoid process. Presents with elevated sternum and costal cage with thoracic extension   TREATMENT:     THERAPEUTIC EXERCISE: (25 minutes):  Exercises per grid below to improve mobility, strength, balance and coordination.   Required moderate visual, verbal, manual and tactile cues to promote proper body alignment, promote proper body posture, promote proper body mechanics and promote proper body breathing techniques. Progressed resistance, range, repetitions and complexity of movement as indicated. Date:  7/27/2021   Activity/Exercise Parameters   Review gym exercise routine    Pendulums X 10 reps, clockwise counter clockwise   Isometric shoulder ER X 10 reps, 5 sec holds, green band   Supine pectoralis stretch on foam roller X 3 reps, 1 minute holds with breathing   Planks    Prone prop scapular stabilization X 5 minute education  X 3 reps, 25 sec holds with green t-band for stability. Side lying circles X 5 reps clockwise and counterclockwise. Standing posture X 10 minute review of neutral costal cage and neutral thoracic extension. Tends to  extension and unable to engage core in his normal position. Child pose  x 5 reps, 10 sec holds  X 5 reps lateral hand placement  X 5 reps thread needle - increased pain in anterior shoulder  X 5 reps of thoracic rotation in neutral       MANUAL THERAPY: (30 minutes): Joint mobilization and Soft tissue mobilization was utilized and necessary because of the patient's restricted joint motion, painful spasm, loss of articular motion and restricted motion of soft tissue. (Used abbreviations: MET - muscle energy technique; PNF - proprioceptive neuromuscular facilitation; NMR - neuromuscular re-education; a/p - anterior to posterior; p/a - posterior to anterior, FMP - functional movement patterns, SF - Superficial Fascia; BC - Bony contours; MP - muscle play; IASTM - instrument-assisted soft tissue mobilization)  · Supine soft tissue mobilization to anterior chest with breathing techniques   · Supine bilateral pectoralis major/minor soft tissue mobilization with breathing techniques and shoulder ER. · Supine bilateral cervical spine manual traction and suboccipital release. · Supine bilateral soft tissue mobilization around bony contours of coracoid process.   · Side lying right for left scapular posterior depression with prolonged holds. [facility] Portal  Treatment/Session Summary:    · Response to Treatment:  Continues to present with soft tissue restrictions in bilateral pectoralis and latissimus dorsi. Very challenged with finding neutral posture of thoracic spine and costal cage. Improved with verbal cueing however will benefit from additional education and tactile feedback. · Communication/Consultation:  None today  · Equipment provided today:  None today  · Recommendations/Intent for next treatment session: Next visit will focus on soft tissue mobilization of anterior chest and shoulders, strengthening exercises for postural stability, postural training for costal cage positions.     Total Treatment Billable Duration:  55 minute treatment   PT Patient Time In/Time Out  Time In: 1530  Time Out: [facility], PT    Future Appointments   Date Time Provider Kaushal Mcleodisti   8/2/2021  4:30 PM Michelle Guzman., PT [facility]   8/11/2021  4:30 PM Elizabeth MAHAJAN, PT [facility]   8/17/2021  3:30 PM Michelle Guzman., PT [facility]   8/26/2021  3:30 PM Michelle Guzman., PT [facility]   9/1/2021  4:30 PM Elizabeth MAHAJAN, PT [facility]   9/24/2021  7:30 AM [facility] LAB RESOURCE [facility]   9/29/2021  9:00 AM Daniel Pryor MD [facility]

## 2021-08-02 ENCOUNTER — HOSPITAL ENCOUNTER (OUTPATIENT)
Dept: PHYSICAL THERAPY | Age: 61
Discharge: HOME | End: 2021-08-02
Payer: COMMERCIAL

## 2021-08-02 PROCEDURE — 97140 MANUAL THERAPY 1/> REGIONS: CPT

## 2021-08-02 PROCEDURE — 97110 THERAPEUTIC EXERCISES: CPT

## 2021-08-02 NOTE — PROGRESS NOTES
Elia Yadav  : 1960  Primary: Kolleen Gaucher [facility]  Secondary:  Therapy Center at [address]  [phone]   [phone]        OUTPATIENT PHYSICAL THERAPY: Daily Treatment Note 2021  Visit Count:  5    ICD-10: Treatment Diagnosis: pain in joint, right shoulder M25.511  ICD-10: Treatment Diagnosis: pain in joint, left shoulder M25.512  ICD-10: Treatment Diagnosis: stiffness in joint, right shoulder M25.611  ICD-10: Treatment Diagnosis: stiffness in joint, left shoulder M25.612  Precautions/Allergies:   Patient has no known allergies. TREATMENT PLAN:  Effective Dates: 2021 TO 2021 (90 days). Frequency/Duration: 1 time a week for 90 Day(s) MEDICAL/REFERRING DIAGNOSIS:  Pain in right shoulder [M25.511]  Pain in left shoulder [M25.512]   DATE OF ONSET: chronic, recent flare up  REFERRING PHYSICIAN: Radha Devi MD MD Orders: evaluate and treat  Return MD Appointment: as needed     Pre-treatment Symptoms/Complaints:  Patient note working on postural positions but continues to be challenged with rib cage. Pain: Initial: Pain Intensity 1: 3  Pain Location 1: Shoulder  Pain Orientation 1: Left, Right /10 Post Session:  3/10   Medications Last Reviewed:  2021  Updated Objective Findings:  Soft tissue restrictions through bilateral pectoralis major/minor, latissimus dorsi, bony contour restrictions around bilateral coracoid process. Presents with elevated sternum and costal cage with thoracic extension   TREATMENT:     THERAPEUTIC EXERCISE: (25 minutes):  Exercises per grid below to improve mobility, strength, balance and coordination. Required moderate visual, verbal, manual and tactile cues to promote proper body alignment, promote proper body posture, promote proper body mechanics and promote proper body breathing techniques.   Progressed resistance, range, repetitions and complexity of movement as indicated. Date:  8/2/2021   Activity/Exercise Parameters   Review gym exercise routine X 5 minute review   Pendulums X 10 reps, clockwise counter clockwise   Isometric shoulder ER X 10 reps, 5 sec holds, green band   Supine pectoralis stretch on foam roller X 3 reps, 1 minute holds with breathing   Planks HEP   Prone prop scapular stabilization HEP   Side lying circles X 5 reps clockwise and counterclockwise. Standing posture X 15 minute review of neutral costal cage and neutral thoracic extension in standing and sitting Tends to  extension and unable to engage core in his normal position. Child pose  x 5 reps, 10 sec holds   Supine and sitting diaphragmatic breathing X 5 minute education  X 5 reps   Supine 90/90 holds X 5 reps, 10 sec holds, work through phasic shakes. MANUAL THERAPY: (30 minutes): Joint mobilization and Soft tissue mobilization was utilized and necessary because of the patient's restricted joint motion, painful spasm, loss of articular motion and restricted motion of soft tissue. (Used abbreviations: MET - muscle energy technique; PNF - proprioceptive neuromuscular facilitation; NMR - neuromuscular re-education; a/p - anterior to posterior; p/a - posterior to anterior, FMP - functional movement patterns, SF - Superficial Fascia; BC - Bony contours; MP - muscle play; IASTM - instrument-assisted soft tissue mobilization)  · Supine soft tissue mobilization to anterior chest with breathing techniques   · Supine bilateral pectoralis major/minor soft tissue mobilization with breathing techniques and shoulder ER. · Supine bilateral cervical spine manual traction and suboccipital release. · Supine bilateral soft tissue mobilization around bony contours of coracoid process. · Side lying right for left scapular posterior depression with prolonged holds.      [facility] Portal  Treatment/Session Summary:    · Response to Treatment:  Continues to be challenged with habitual postural patterns of elevated sternum and costal cage, however improving awareness and improvement in position with verbal feedback. · Communication/Consultation:  None today  · Equipment provided today:  None today  · Recommendations/Intent for next treatment session: Next visit will focus on soft tissue mobilization of anterior chest and shoulders, strengthening exercises for postural stability, postural training for costal cage positions.     Total Treatment Billable Duration:  55 minute treatment   PT Patient Time In/Time Out  Time In: 1630  Time Out: ELI Lynn    Future Appointments   Date Time Provider Kaushal Beasley   8/11/2021  4:30 PM Dorinasvitlana Beauchamp, PT [facility]   8/17/2021  3:30 PM Martin MAHAJAN, PT [facility]   8/26/2021  3:30 PM Dorinasvitlana Beauchamp, PT [facility]   9/1/2021  4:30 PM Martin MAHAJAN, PT [facility]   9/24/2021  7:30 AM HTF LAB RESOURCE [facility]   9/29/2021  9:00 AM Todd Stinson MD [facility]

## 2021-08-11 ENCOUNTER — HOSPITAL ENCOUNTER (OUTPATIENT)
Dept: PHYSICAL THERAPY | Age: 61
Discharge: HOME | End: 2021-08-11
Payer: COMMERCIAL

## 2021-08-11 PROCEDURE — 97110 THERAPEUTIC EXERCISES: CPT

## 2021-08-11 PROCEDURE — 97140 MANUAL THERAPY 1/> REGIONS: CPT

## 2021-08-12 NOTE — PROGRESS NOTES
Roger Franco  : 1960  Primary: Luis Felipe Quiñonez [facility]  Secondary:  [facility]rapy Center at [facility], [address] [facility]  [phone]   [phone]        OUTPATIENT PHYSICAL THERAPY: Daily Treatment Note 2021  Visit Count:  6    ICD-10: Treatment Diagnosis: pain in joint, right shoulder M25.511  ICD-10: Treatment Diagnosis: pain in joint, left shoulder M25.512  ICD-10: Treatment Diagnosis: stiffness in joint, right shoulder M25.611  ICD-10: Treatment Diagnosis: stiffness in joint, left shoulder M25.612  Precautions/Allergies:   Patient has no known allergies. TREATMENT PLAN:  Effective Dates: 2021 TO 2021 (90 days). Frequency/Duration: 1 time a week for 90 Day(s) MEDICAL/REFERRING DIAGNOSIS:  Pain in right shoulder [M25.511]  Pain in left shoulder [M25.512]   DATE OF ONSET: chronic, recent flare up  REFERRING PHYSICIAN: Daniel Pryor MD MD Orders: evaluate and treat  Return MD Appointment: as needed     Pre-treatment Symptoms/Complaints:  Patient notes overall less pain noted in bilateral shoulders and improved ROM noted. Continues to have stiffness at available end range. Pain: Initial: Pain Intensity 1: 3  Pain Location 1: Shoulder  Pain Orientation 1: Left, Right /10 Post Session:  3/10   Medications Last Reviewed:  2021  Updated Objective Findings:  Soft tissue restrictions through bilateral pectoralis major/minor, latissimus dorsi, bony contour restrictions around bilateral coracoid process. Presents with elevated sternum and costal cage with thoracic extension   TREATMENT:     THERAPEUTIC EXERCISE: (25 minutes):  Exercises per grid below to improve mobility, strength, balance and coordination. Required moderate visual, verbal, manual and tactile cues to promote proper body alignment, promote proper body posture, promote proper body mechanics and promote proper body breathing techniques.   Progressed resistance, range, repetitions and complexity of movement as indicated. Date:  8/11/2021   Activity/Exercise Parameters   Review gym exercise routine X 5 minute review   Pendulums X 10 reps, clockwise counter clockwise   Isometric shoulder ER X 10 reps, 5 sec holds, green band   Supine pectoralis stretch on foam roller X 3 reps, 1 minute holds with breathing   Planks HEP   Prone prop scapular stabilization HEP   Side lying circles X 5 reps clockwise and counterclockwise. Standing posture X 15 minute review of neutral costal cage and neutral thoracic extension in standing and sitting Tends to  extension and unable to engage core in his normal position. Child pose  x 5 reps, 10 sec holds   Supine and sitting diaphragmatic breathing X 5 minute education  X 5 reps   Supine 90/90 holds X 5 reps, 10 sec holds, work through phasic shakes. Prone T and I X 10 reps, 5 sec holds       MANUAL THERAPY: (30 minutes): Joint mobilization and Soft tissue mobilization was utilized and necessary because of the patient's restricted joint motion, painful spasm, loss of articular motion and restricted motion of soft tissue. (Used abbreviations: MET - muscle energy technique; PNF - proprioceptive neuromuscular facilitation; NMR - neuromuscular re-education; a/p - anterior to posterior; p/a - posterior to anterior, FMP - functional movement patterns, SF - Superficial Fascia; BC - Bony contours; MP - muscle play; IASTM - instrument-assisted soft tissue mobilization)  · Supine soft tissue mobilization to anterior chest with breathing techniques   · Supine bilateral pectoralis major/minor soft tissue mobilization with breathing techniques and shoulder ER. · Supine bilateral cervical spine manual traction and suboccipital release. · Supine bilateral soft tissue mobilization around bony contours of coracoid process. · Side lying right for left scapular posterior depression with prolonged holds.    · Supine myofascial decompression to anterior shoulders bilaterally with FMP of shoulder ER. [facility] Portal  Treatment/Session Summary:    · Response to Treatment:  Improving mobility in anterior shoulders and improving bilateral shoulder flexion and abduction. · Communication/Consultation:  None today  · Equipment provided today:  None today  · Recommendations/Intent for next treatment session: Next visit will focus on soft tissue mobilization of anterior chest and shoulders, strengthening exercises for postural stability, postural training for costal cage positions.     Total Treatment Billable Duration:  55 minute treatment   PT Patient Time In/Time Out  Time In: 1630  Time Out: ELI Lynn    Future Appointments   Date Time Provider Kaushal Beasley   8/17/2021  3:30 PM Cristian Tariq., PT [facility]   8/26/2021  3:30 PM Lady Kecia MAHAJAN, PT [facility]   9/1/2021  4:30 PM Cristian Tariq., PT [facility]   9/7/2021  4:30 PM Cristian Tariq., PT [facility]   9/20/2021  4:30 PM Lady Kecia MAHAJAN, PT [facility]   9/24/2021  7:30 AM HTF LAB RESOURCE [facility]   9/29/2021  9:00 AM Josette Hdz MD [facility] HTF HTF   10/4/2021  4:30 PM Cristian Tariq., PT [facility]   10/18/2021  4:30 PM Cristian Wood, PT [facility]

## 2021-08-17 ENCOUNTER — HOSPITAL ENCOUNTER (OUTPATIENT)
Dept: PHYSICAL THERAPY | Age: 61
Discharge: HOME | End: 2021-08-17
Payer: COMMERCIAL

## 2021-08-17 PROCEDURE — 97140 MANUAL THERAPY 1/> REGIONS: CPT

## 2021-08-17 PROCEDURE — 97110 THERAPEUTIC EXERCISES: CPT

## 2021-08-18 NOTE — PROGRESS NOTES
Sarah Bloodgood  : 1960  Primary: Hermes Wilma [facility]*  Secondary:  Therapy Center at [place] [address], [address] [facility]  [phone]   [phone]        OUTPATIENT PHYSICAL THERAPY: Daily Treatment Note 2021  Visit Count:  7    ICD-10: Treatment Diagnosis: pain in joint, right shoulder M25.511  ICD-10: Treatment Diagnosis: pain in joint, left shoulder M25.512  ICD-10: Treatment Diagnosis: stiffness in joint, right shoulder M25.611  ICD-10: Treatment Diagnosis: stiffness in joint, left shoulder M25.612  Precautions/Allergies:   Patient has no known allergies. TREATMENT PLAN:  Effective Dates: 2021 TO 2021 (90 days). Frequency/Duration: 1 time a week for 90 Day(s) MEDICAL/REFERRING DIAGNOSIS:  Pain in right shoulder [M25.511]  Pain in left shoulder [M25.512]   DATE OF ONSET: chronic, recent flare up  REFERRING PHYSICIAN: Nahun Briggs MD MD Orders: evaluate and treat  Return MD Appointment: as needed     Pre-treatment Symptoms/Complaints:  Patient notes exercises are going well at home, noting improvements in ROM of bilateral shoulders. However continues to have challenges at end range of shoulder ROM flexion and abduction. Pain: Initial: Pain Intensity 1: 3  Pain Location 1: Shoulder  Pain Orientation 1: Left, Right /10 Post Session:  3/10   Medications Last Reviewed:  2021  Updated Objective Findings:  Soft tissue restrictions through bilateral pectoralis major/minor, latissimus dorsi, bony contour restrictions around bilateral coracoid process. Presents with elevated sternum and costal cage with thoracic extension   TREATMENT:     THERAPEUTIC EXERCISE: (15 minutes):  Exercises per grid below to improve mobility, strength, balance and coordination.   Required moderate visual, verbal, manual and tactile cues to promote proper body alignment, promote proper body posture, promote proper body mechanics and promote proper body breathing techniques. Progressed resistance, range, repetitions and complexity of movement as indicated. Date:  8/17/2021   Activity/Exercise Parameters   Review gym exercise routine X 5 minute review   Pendulums X 10 reps, clockwise counter clockwise   Isometric shoulder ER    Supine pectoralis stretch on foam roller X 3 reps, 1 minute holds with breathing   Planks HEP   Prone prop scapular stabilization HEP   Side lying circles X 5 reps clockwise and counterclockwise. Standing posture    Child pose    Supine and sitting diaphragmatic breathing x 5 reps   Supine 90/90 holds X 5 reps, 10 sec holds, work through phasic shakes. Prone T and I X 10 reps, 5 sec holds       MANUAL THERAPY: (40 minutes): Joint mobilization and Soft tissue mobilization was utilized and necessary because of the patient's restricted joint motion, painful spasm, loss of articular motion and restricted motion of soft tissue. (Used abbreviations: MET - muscle energy technique; PNF - proprioceptive neuromuscular facilitation; NMR - neuromuscular re-education; a/p - anterior to posterior; p/a - posterior to anterior, FMP - functional movement patterns, SF - Superficial Fascia; BC - Bony contours; MP - muscle play; IASTM - instrument-assisted soft tissue mobilization)  · Supine soft tissue mobilization to anterior chest with breathing techniques   · Supine bilateral pectoralis major/minor soft tissue mobilization with breathing techniques and shoulder ER. · Supine bilateral cervical spine manual traction and suboccipital release. · Supine bilateral soft tissue mobilization around bony contours of coracoid process. · Side lying right for left scapular posterior depression with prolonged holds. · Supine myofascial decompression to anterior shoulders bilaterally with FMP of shoulder ER. · Supine bilateral inferior glides with mobilization belt with inferior glide of clavicle.      [facility] Portal  Treatment/Session Summary: · Response to Treatment: improving overall mobility in bilateral shoulder ROM in flexion and abduction. Improving overall postural positions of costal cage. · Communication/Consultation:  None today  · Equipment provided today:  None today  · Recommendations/Intent for next treatment session: Next visit will focus on soft tissue mobilization of anterior chest and shoulders, strengthening exercises for postural stability, postural training for costal cage positions.     Total Treatment Billable Duration:  55 minute treatment   PT Patient Time In/Time Out  Time In: 1530  Time Out: [facility], PT    Future Appointments   Date Time Provider Kaushal Beasley   8/26/2021  3:30 PM Taye Mac., PT [facility]   9/1/2021  4:30 PM Sandy MAHAJAN, PT [facility]   9/7/2021  4:30 PM Taye Mac., PT [facility]   9/20/2021  4:30 PM Sandy MAHAJAN, PT [facility]   9/24/2021  7:30 AM HTF LAB RESOURCE [facility]   9/29/2021  9:00 AM Arnie Keita MD [facility]   10/4/2021  4:30 PM Taye Mac., PT [facility]   10/18/2021  4:30 PM Taye Mac., PT [facility]

## 2021-08-26 ENCOUNTER — HOSPITAL ENCOUNTER (OUTPATIENT)
Dept: PHYSICAL THERAPY | Age: 61
Discharge: HOME | End: 2021-08-26
Payer: COMMERCIAL

## 2021-08-26 PROCEDURE — 97140 MANUAL THERAPY 1/> REGIONS: CPT

## 2021-08-26 PROCEDURE — 97110 THERAPEUTIC EXERCISES: CPT

## 2021-08-27 NOTE — PROGRESS NOTES
Marine Fonseca  : 1960  Primary: Daksha Petit Of [facility]*  Secondary:  [facility] [place] [address], [address] [facility]  [phone]   [phone]        OUTPATIENT PHYSICAL THERAPY: Daily Treatment Note 2021  Visit Count:  8    ICD-10: Treatment Diagnosis: pain in joint, right shoulder M25.511  ICD-10: Treatment Diagnosis: pain in joint, left shoulder M25.512  ICD-10: Treatment Diagnosis: stiffness in joint, right shoulder M25.611  ICD-10: Treatment Diagnosis: stiffness in joint, left shoulder M25.612  Precautions/Allergies:   Patient has no known allergies. TREATMENT PLAN:  Effective Dates: 2021 TO 2021 (90 days). Frequency/Duration: 1 time a week for 90 Day(s) MEDICAL/REFERRING DIAGNOSIS:  Pain in right shoulder [M25.511]  Pain in left shoulder [M25.512]   DATE OF ONSET: chronic, recent flare up  REFERRING PHYSICIAN: Kenny Snow MD MD Orders: evaluate and treat  Return MD Appointment: as needed     Pre-treatment Symptoms/Complaints:  Patient notes improving ROM in bilateral shoulders, however soreness noted at available end-ranges. Able to do more activity but still has not been able to return to all activities. Pain: Initial: Pain Intensity 1: 3  Pain Location 1: Shoulder  Pain Orientation 1: Left, Right /10 Post Session:  3/10   Medications Last Reviewed:  2021  Updated Objective Findings:  Soft tissue restrictions through bilateral pectoralis major/minor, latissimus dorsi, bony contour restrictions around bilateral coracoid process. Presents with elevated sternum and costal cage with thoracic extension   TREATMENT:     THERAPEUTIC EXERCISE: (15 minutes):  Exercises per grid below to improve mobility, strength, balance and coordination.   Required moderate visual, verbal, manual and tactile cues to promote proper body alignment, promote proper body posture, promote proper body mechanics and promote proper body breathing techniques. Progressed resistance, range, repetitions and complexity of movement as indicated. Date:  8/26/2021   Activity/Exercise Parameters   Review gym exercise routine X 5 minute review   Pendulums X 10 reps, clockwise counter clockwise   Isometric shoulder ER    Supine pectoralis stretch on foam roller X 3 reps, 1 minute holds with breathing   Planks HEP   Prone prop scapular stabilization HEP   Side lying circles X 5 reps clockwise and counterclockwise. Standing posture    Child pose    Supine and sitting diaphragmatic breathing x 5 reps   Supine 90/90 holds X 5 reps, 10 sec holds, work through phasic shakes. Prone T and I        MANUAL THERAPY: (40 minutes): Joint mobilization and Soft tissue mobilization was utilized and necessary because of the patient's restricted joint motion, painful spasm, loss of articular motion and restricted motion of soft tissue. (Used abbreviations: MET - muscle energy technique; PNF - proprioceptive neuromuscular facilitation; NMR - neuromuscular re-education; a/p - anterior to posterior; p/a - posterior to anterior, FMP - functional movement patterns, SF - Superficial Fascia; BC - Bony contours; MP - muscle play; IASTM - instrument-assisted soft tissue mobilization)  · Supine soft tissue mobilization to anterior chest with breathing techniques   · Supine bilateral pectoralis major/minor soft tissue mobilization with breathing techniques and shoulder ER. · Supine bilateral cervical spine manual traction and suboccipital release. · Supine bilateral soft tissue mobilization around bony contours of coracoid process. · Side lying right for left scapular posterior depression with prolonged holds. · Supine myofascial decompression to anterior shoulders bilaterally with FMP of shoulder ER. · Supine bilateral inferior glides with mobilization belt with inferior glide of clavicle.      [employer] Portal  Treatment/Session Summary:    · Response to Treatment: improving ROM in shoulder flexion and abduction bilaterally. Improved engagement of scapula into posterior depression. Continues to be challenged with ROM beyond 120 degrees of flexion in bilateral shoulders. · Communication/Consultation:  None today  · Equipment provided today:  None today  · Recommendations/Intent for next treatment session: Next visit will focus on soft tissue mobilization of anterior chest and shoulders, strengthening exercises for postural stability, postural training for costal cage positions.     Total Treatment Billable Duration:  55 minute treatment   PT Patient Time In/Time Out  Time In: 1530  Time Out: [facility],     Future Appointments   Date Time Provider Kaushal Beasley   9/1/2021  4:30 PM Michell Gutting., PT [facility]   9/7/2021  4:30 PM Michell Gutting., PT [facility]   9/20/2021  4:30 PM Twila MAHAJAN, PT [facility]   9/24/2021  7:30 AM [facility] LAB RESOURCE [facility]   9/29/2021  9:00 AM Jdaa Curtis MD [facility]   10/4/2021  4:30 PM Michell Gutting., PT [facility]   10/18/2021  4:30 PM Michell Gutting., PT [facility]

## 2021-09-01 ENCOUNTER — HOSPITAL ENCOUNTER (OUTPATIENT)
Dept: PHYSICAL THERAPY | Age: 61
Discharge: HOME | End: 2021-09-01
Payer: COMMERCIAL

## 2021-09-01 PROCEDURE — 97140 MANUAL THERAPY 1/> REGIONS: CPT

## 2021-09-01 PROCEDURE — 97110 THERAPEUTIC EXERCISES: CPT

## 2021-09-02 NOTE — PROGRESS NOTES
Nav Quick  : 1960  Primary: Deidre Matute Of [facility]*  Secondary:  [facility] at [address]  [phone]   [phone]        OUTPATIENT PHYSICAL THERAPY: Daily Treatment Note 2021  Visit Count:  9    ICD-10: Treatment Diagnosis: pain in joint, right shoulder M25.511  ICD-10: Treatment Diagnosis: pain in joint, left shoulder M25.512  ICD-10: Treatment Diagnosis: stiffness in joint, right shoulder M25.611  ICD-10: Treatment Diagnosis: stiffness in joint, left shoulder M25.612  Precautions/Allergies:   Patient has no known allergies. TREATMENT PLAN:  Effective Dates: 2021 TO 2021 (90 days). Frequency/Duration: 1 time a week for 90 Day(s) MEDICAL/REFERRING DIAGNOSIS:  Pain in right shoulder [M25.511]  Pain in left shoulder [M25.512]   DATE OF ONSET: chronic, recent flare up  REFERRING PHYSICIAN: Jada Curtis MD MD Orders: evaluate and treat  Return MD Appointment: as needed     Pre-treatment Symptoms/Complaints:  Patient notes less pain in bilateral shoulders, continues to be limited with ROM past 90 degrees of abduction. Pain: Initial: Pain Intensity 1: 3  Pain Location 1: Shoulder  Pain Orientation 1: Left, Right /10 Post Session:  2/10   Medications Last Reviewed:  2021  Updated Objective Findings:  Soft tissue restrictions through bilateral pectoralis major/minor, latissimus dorsi, bony contour restrictions around bilateral coracoid process. Presents with elevated sternum and costal cage with thoracic extension   TREATMENT:     THERAPEUTIC EXERCISE: (15 minutes):  Exercises per grid below to improve mobility, strength, balance and coordination. Required moderate visual, verbal, manual and tactile cues to promote proper body alignment, promote proper body posture, promote proper body mechanics and promote proper body breathing techniques.   Progressed resistance, range, repetitions and complexity of movement as indicated. Date:  9/1/2021   Activity/Exercise Parameters   Review gym exercise routine X 5 minute review   Pendulums X 10 reps, clockwise counter clockwise   Isometric shoulder ER    Supine pectoralis stretch on foam roller X 3 reps, 1 minute holds with breathing   Planks HEP   Prone prop scapular stabilization HEP   Side lying circles X 5 reps clockwise and counterclockwise. Standing posture    Child pose    Supine and sitting diaphragmatic breathing x 5 reps   Supine 90/90 holds X 5 reps, 10 sec holds, work through phasic shakes. Prone T and I x10 reps, 5 sec holds, overall ball   Prone bilateral I with posterior depression of scapula X 10 reps, 15 sec holds       MANUAL THERAPY: (40 minutes): Joint mobilization and Soft tissue mobilization was utilized and necessary because of the patient's restricted joint motion, painful spasm, loss of articular motion and restricted motion of soft tissue. (Used abbreviations: MET - muscle energy technique; PNF - proprioceptive neuromuscular facilitation; NMR - neuromuscular re-education; a/p - anterior to posterior; p/a - posterior to anterior, FMP - functional movement patterns, SF - Superficial Fascia; BC - Bony contours; MP - muscle play; IASTM - instrument-assisted soft tissue mobilization)  · Supine soft tissue mobilization to anterior chest with breathing techniques   · Supine bilateral pectoralis major/minor soft tissue mobilization with breathing techniques and shoulder ER. · Supine bilateral cervical spine manual traction and suboccipital release. · Supine bilateral soft tissue mobilization around bony contours of coracoid process. · Side lying right for left scapular posterior depression with prolonged holds. · Supine myofascial decompression to anterior shoulders bilaterally with FMP of shoulder ER. · Supine bilateral inferior glides with mobilization belt with inferior glide of clavicle.    · Prone soft tissue mobilization to periscapular muscles and bont contours of groove of spine and medial border of scapula    Med[employer] Portal  Treatment/Session Summary:    · Response to Treatment: Improvements noted in ROM of bilateral shoulders. Decreased restrictions noted in soft tissue of anterior shoulders. · Communication/Consultation:  None today  · Equipment provided today:  None today  · Recommendations/Intent for next treatment session: Next visit will focus on soft tissue mobilization of anterior chest and shoulders, strengthening exercises for postural stability, postural training for costal cage positions.     Total Treatment Billable Duration:  55 minute treatment   PT Patient Time In/Time Out  Time In: 1630  Time Out: ELI Lynn    Future Appointments   Date Time Provider Kaushal Beasley   9/7/2021  4:30 PM Piotr Miguel, PT [facility]   9/20/2021  4:30 PM Muna MAHAJAN PT [facility]   9/24/2021  7:30 AM [facility] LAB RESOURCE [facility]   9/29/2021  9:00 AM Obi Handy MD [facility]   10/4/2021  4:30 PM Piotr Mccain., PT [facility]   10/18/2021  4:30 PM [facility]

## 2021-09-07 ENCOUNTER — HOSPITAL ENCOUNTER (OUTPATIENT)
Dept: PHYSICAL THERAPY | Age: 61
Discharge: HOME | End: 2021-09-07
Payer: COMMERCIAL

## 2021-09-07 PROCEDURE — 97140 MANUAL THERAPY 1/> REGIONS: CPT

## 2021-09-07 PROCEDURE — 97110 THERAPEUTIC EXERCISES: CPT

## 2021-09-08 NOTE — PROGRESS NOTES
Jessie Gutiérrez  : 1960  Primary: Monalisa Souza [facility]*  Secondary:  [facility] at [address]  [phone]   [phone]        OUTPATIENT PHYSICAL THERAPY: Daily Treatment Note 2021  Visit Count:  10    ICD-10: Treatment Diagnosis: pain in joint, right shoulder M25.511  ICD-10: Treatment Diagnosis: pain in joint, left shoulder M25.512  ICD-10: Treatment Diagnosis: stiffness in joint, right shoulder M25.611  ICD-10: Treatment Diagnosis: stiffness in joint, left shoulder M25.612  Precautions/Allergies:   Patient has no known allergies. TREATMENT PLAN:  Effective Dates: 2021 TO 2021 (90 days). Frequency/Duration: 1 time a week for 90 Day(s) MEDICAL/REFERRING DIAGNOSIS:  Pain in right shoulder [M25.511]  Pain in left shoulder [M25.512]   DATE OF ONSET: chronic, recent flare up  REFERRING PHYSICIAN: Star Oquendo MD MD Orders: evaluate and treat  Return MD Appointment: as needed     Pre-treatment Symptoms/Complaints:  Patient notes he continues to note less pain in bilateral shoulders however continues to be restricted in ROM of bilateral shoulders. Notes very sore from this past weekend secondary to increased activity. Pain: Initial: Pain Intensity 1: 2  Pain Location 1: Shoulder  Pain Orientation 1: Left, Right /10 Post Session:  2/10   Medications Last Reviewed:  2021  Updated Objective Findings:  Soft tissue restrictions through bilateral pectoralis major/minor, latissimus dorsi, bony contour restrictions around bilateral coracoid process. Presents with elevated sternum and costal cage with thoracic extension   TREATMENT:     THERAPEUTIC EXERCISE: (10 minutes):  Exercises per grid below to improve mobility, strength, balance and coordination.   Required moderate visual, verbal, manual and tactile cues to promote proper body alignment, promote proper body posture, promote proper body mechanics and promote proper body breathing techniques. Progressed resistance, range, repetitions and complexity of movement as indicated. Date:  9/7/2021   Activity/Exercise Parameters   Review gym exercise routine X 5 minute review   Pendulums X 10 reps, clockwise counter clockwise   Isometric shoulder ER    Supine pectoralis stretch on foam roller X 3 reps, 1 minute holds with breathing   Planks HEP   Prone prop scapular stabilization HEP   Side lying circles X 5 reps clockwise and counterclockwise. Standing posture    Child pose    Supine and sitting diaphragmatic breathing x 5 reps   Supine 90/90 holds X 5 reps, 10 sec holds, work through phasic shakes. Prone T and I x10 reps, 5 sec holds, overall ball   Prone bilateral I with posterior depression of scapula X 10 reps, 15 sec holds   Seated thoracic extension Review techniques, however challenged with obtaining thoracic extension in this position. MANUAL THERAPY: (40 minutes): Joint mobilization and Soft tissue mobilization was utilized and necessary because of the patient's restricted joint motion, painful spasm, loss of articular motion and restricted motion of soft tissue. (Used abbreviations: MET - muscle energy technique; PNF - proprioceptive neuromuscular facilitation; NMR - neuromuscular re-education; a/p - anterior to posterior; p/a - posterior to anterior, FMP - functional movement patterns, SF - Superficial Fascia; BC - Bony contours; MP - muscle play; IASTM - instrument-assisted soft tissue mobilization)  · Supine soft tissue mobilization to anterior chest with breathing techniques - not today  · Supine bilateral pectoralis major/minor soft tissue mobilization with breathing techniques and shoulder ER. - not today  · Supine bilateral cervical spine manual traction and suboccipital release. - not today  · Supine bilateral soft tissue mobilization around bony contours of coracoid process.  - not today  · Side lying right for left scapular posterior depression with prolonged holds. Not today  · Supine myofascial decompression to anterior shoulders bilaterally with FMP of shoulder ER. Not today  · Supine bilateral inferior glides with mobilization belt with inferior glide of clavicle. Not today  · Prone soft tissue mobilization to periscapular muscles and bont contours of groove of spine and medial border of scapula  · Prone superficial fascia along posterior torso with breathing techniques    Dry Needling (5 minutes): With written consent received and precautions reviewed, instrument-assisted soft tissue mobilization was performed to bilateral infraspinatus and latissimus dorsi for the purpose of trigger point release with a positive treatment effect and no complications noted. Dry Needles Used: 6   Dry Needles Removed: 6         [email or web address] Portal  Treatment/Session Summary:    · Response to Treatment: improving mobility in bilateral shoulder ROM, continues to be challenged with thoracic spine extension. · Communication/Consultation:  None today  · Equipment provided today:  None today  · Recommendations/Intent for next treatment session: Next visit will focus on soft tissue mobilization of anterior chest and shoulders, strengthening exercises for postural stability, postural training for costal cage positions.     Total Treatment Billable Duration:  55 minute treatment   PT Patient Time In/Time Out  Time In: 1640  Time Out: [address] [facility] [address], PT    Future Appointments   Date Time Provider Kaushal Beasley   9/20/2021  4:30 PM Cristian Wood PT [facility]   9/24/2021  7:30 AM [facility]   9/29/2021  9:00 AM [facility]   10/4/2021  4:30 PM Cristian Wood, PT [facility]   10/18/2021  4:30 PM Cristian Wood, PT [facility]

## 2021-09-08 NOTE — PROGRESS NOTES
Nikki Simmons  : 1960  Primary: [facility]  Secondary:  [facility] at [address]  [phone]   [phone]        OUTPATIENT PHYSICAL THERAPY:Progress Report 2021   ICD-10: Treatment Diagnosis: pain in joint, right shoulder M25.511  ICD-10: Treatment Diagnosis: pain in joint, left shoulder M25.512  ICD-10: Treatment Diagnosis: stiffness in joint, right shoulder M25.611  ICD-10: Treatment Diagnosis: stiffness in joint, left shoulder M25.612  Precautions/Allergies:   Patient has no known allergies. TREATMENT PLAN:  Effective Dates: 2021 TO 2021 (90 days). Frequency/Duration: 1 time a week for 90 Day(s) MEDICAL/REFERRING DIAGNOSIS:  Pain in right shoulder [M25.511]  Pain in left shoulder [M25.512]   DATE OF ONSET: chronic, recent flare up  REFERRING PHYSICIAN: Todd Stinson MD MD Orders: evaluate and treat  Return MD Appointment: as needed     PROGRESS NOTE: 21:  Nikki Simmons has attended 10 out of 10 scheduled visits, with 0 cancellation(s) and 0 no shows. Overall pain levels have decreased in bilateral shoulders. Continues to be challenged with ROM in bilateral shoulders. He continues to be challenged with prolonged activities secondary to fatigue and lack of motion in his UQ. He would benefit from continued therapy services to improve overall mobility with daily activities. INITIAL ASSESSMENT:  Mr. Marisol Lincoln is a 59 y.o. male who presents to physical therapy with chronic bilateral shoulder pain and stiffness. He is limited in ROM of bilateral shoulders in multiple directions. He presents with significant imbalances between his pectoralis major/minor and biceps compared to his posterior scapular musculature. This imbalance has created deficits in his mobility of his shoulder girdles bilaterally.  He would benefit from skilled physical therapy to improve overall mobility of bilateral shoulder girdles, increase ROM of bilateral shoulder glenohumeral joints, decreased soft tissue restrictions and improve balance in UQ musculature. PROBLEM LIST (Impacting functional limitations):  1. Decreased Strength  2. Decreased ADL/Functional Activities  3. Increased Pain  4. Decreased Activity Tolerance  5. Decreased Flexibility/Joint Mobility INTERVENTIONS PLANNED: (Treatment may consist of any combination of the following)  1. Home Exercise Program (HEP)  2. Manual Therapy  3. Neuromuscular Re-education/Strengthening  4. Range of Motion (ROM)  5. Therapeutic Activites  6. Therapeutic Exercise/Strengthening     GOALS: (Goals have been discussed and agreed upon with patient.)    Discharge Goals: Time Frame: 90 days  1. Patient demonstrates independence with his HEP without verbal cueing. GOAL MET, progress as needed  2. Patient demonstrate full functional ROM to bilateral shoulder for ability to perform overhead activity with pain no more than 0-2/10. ONGOING  3. Patient able to sleep for 6 consecutive hours without awoke from bilateral shoulder pain. GOAL MET  4. Improve DASH outcome measure score from 37/55 to 20/55 to perform ADLs ONGOING  5. Patient demonstrates ability to perform yard work and house hold activities with 0/10 pain and full functional ROM. - ONGOING    OUTCOME MEASURE:   Tool Used: Disabilities of the Arm, Shoulder and Hand (DASH) Questionnaire - Quick Version  Score:  Initial: 37/55  Most Recent: X/55 (Date: -- )   Interpretation of Score: The DASH is designed to measure the activities of daily living in person's with upper extremity dysfunction or pain. Each section is scored on a 1-5 scale, 5 representing the greatest disability. The scores of each section are added together for a total score of 55.         MEDICAL NECESSITY:   · Patient is expected to demonstrate progress in strength, range of motion, balance, coordination and functional technique to increase independence with increasing ROM in bilateral shoulder for ability to perform ADLs with improved mobility and decreased pain. REASON FOR SERVICES/OTHER COMMENTS:  · Patient continues to require skilled intervention due to limited ROM in bilateral shoulders preventing him to be able to complete functional daily tasks. .  Total Duration:  PT Patient Time In/Time Out  Time In: 1640  Time Out: 1740    Rehabilitation Potential For Stated Goals: Excellent  Regarding Pritesh Watkins's therapy, I certify that the treatment plan above will be carried out by a therapist or under their direction. Thank you for this referral,  Pily Chan, PT     Referring Physician Signature: Josette Hdz MD No Signature is Required for this note. PAIN/SUBJECTIVE:   Initial: Pain Intensity 1: 2  Pain Location 1: Shoulder  Pain Orientation 1: Left, Right  Post Session:  7/10   HISTORY:   History of Injury/Illness (Reason for Referral):  Chronic history of right shoulder pain, however recently has noted increased discomfort in bilateral shoulders and limited ability to perform ROM in shoulders, left greater than right. He has stopped going to the gym for a short duration during [place] and now has returned. Notes he is limited with some of his gym exercises secondary to pain and limited mobility. He also notes discomfort in bilateral shoulders after prolonged sitting and then initial movement after. He also note discomfort with sleeping positions and pain into bilateral shoulders. He recently had an anti-inflammatory and steroid injection and noted 90% improvement in his ROM and pain levels. However, this relief only lasted for about a week. He notes his shoulders feel more like they \"lock up\" and symptoms are slowly progressing. He takes ibuprofen each night, notes if he didn't take it, symptoms would be worse, helps with pain, however does not help with stiffness. Notes clunking in bilateral shoulders.  Works out daily and has a routine for different muscle groups each day. Patient denies dizziness, drop attacks, numbness/tingling, bowel/bladder dysfunction and/or unexplained weight gain/loss. Current Medications:       Current Outpatient Medications:     CHARCOAL, by Does Not Apply route., Disp: , Rfl:     OTHER, Indications: Cholrophyll, Disp: , Rfl:     ibuprofen (MOTRIN) 200 mg tablet, Take  by mouth., Disp: , Rfl:     glucosamine (GLUCOSAMINE RELIEF) 1,000 mg tab, Take  by mouth., Disp: , Rfl:    Date Last Reviewed:  9/7/2021     EXAMINATION:   Observation/Orthostatic Postural Assessment:          Shoulder symmetry exhibits bilateral protraction. Shoulder girdles are right elevated compared to left. Scapular position is right elevated compared to left. Clavicles are right elevated compared. Soft tissue observations indicates muscle imbalances between anterior and posterior musculature. Significant restrictions noted in bilateral pectoralis major and minor, biceps. Moderate restrictions in bilateral latissimus dorsi, periscapular musculature. Patient exhibits a decreased cervical lordosis,  neutral thoracic kyphosis, elevated sternum and elevated costal cage with thoracic spine extension. .  Improving 9/7/2021    Palpation:  Myofascial assessment indicates restrictions in anterior chest.  Improving 9/7/2021  Muscle length testing levator scapulae with ipsilateral arm abduction is moderate restrictions left. Upper trapezius length is mild restrictions bilaterally. Improving 9/7/2021  Pectoralis minor/major and latissimus dorsi length is restrictions bilaterally. Improving 9/7/2021 Scalene muscle length is mild restrictions bilaterally. ROM: Gross active cervical spine rotation is 90% available bilaterally. Apley's scratch test for functional ER/IR is restricted bilaterally: left base of skull/side torso, right: base of skull, side torso . Passive Accessory Motion: Glenohumeral mobility is restricted bilaterally for inferior glide. Acromioclavicular mobility is decreased posterior glides. Improving 9/7/2021 Sternoclavicular mobility is decreased a/p glide. Improving 9/7/2021 Scapulothoracic mobility is restricted bilateral posterior depression. Presents with anterior tilt of costal cage with thoracic extension. AROM(PROM) in degrees Right Left   Shoulder flexion 0-110 0-110   Shoulder abduction (palm down) 0-90 0-90   Shoulder extension 0-10 0-10   Shoulder internal rotation (IR)  (measured at 45 degrees of abduction) 0-10 0-10   ER (measured at 45 degrees of abduction) 0-15 0-10     Strength:   Manual Muscle Test (*/5) Right Left   Shoulder flexion 4 4   Shoulder extension 4 4   Shoulder abduction 4 4   Shoulder adduction 4 4   Shoulder ER 4 4   Shoulder IR 4 4   Upper trapezius 5 5   Middle trapezius 4 4   Lower trapezius 4 4   Rhomboids 4 4   Serratus Anterior 4 4   Elbow flexion 5 5   Elbow extension 4 4     Functional Mobility:  Challenged with overhead and reaching, challenged with sleeping positions and sitting positions. Improving 9/7/2021       Body Structures Involved:  1. Thoracic Cage  2. Bones  3. Joints  4. Muscles Body Functions Affected:  1. Sensory/Pain  2. Neuromusculoskeletal  3. Movement Related Activities and Participation Affected:  1. General Tasks and Demands  2. Mobility  3. Self Care  4.  Community, Social and [place]

## 2021-09-20 ENCOUNTER — HOSPITAL ENCOUNTER (OUTPATIENT)
Dept: PHYSICAL THERAPY | Age: 61
Discharge: HOME | End: 2021-09-20
Payer: COMMERCIAL

## 2021-09-20 PROCEDURE — 97110 THERAPEUTIC EXERCISES: CPT

## 2021-09-20 PROCEDURE — 97140 MANUAL THERAPY 1/> REGIONS: CPT

## 2021-09-21 NOTE — PROGRESS NOTES
Zenobia Garzon  : 1960  Primary: [facility]  Secondary:  [facility] at [address], [address]  [phone]   [phone]        OUTPATIENT PHYSICAL THERAPY: Daily Treatment Note 2021  Visit Count:  11    ICD-10: Treatment Diagnosis: pain in joint, right shoulder M25.511  ICD-10: Treatment Diagnosis: pain in joint, left shoulder M25.512  ICD-10: Treatment Diagnosis: stiffness in joint, right shoulder M25.611  ICD-10: Treatment Diagnosis: stiffness in joint, left shoulder M25.612  Precautions/Allergies:   Patient has no known allergies. TREATMENT PLAN:  Effective Dates: 2021 TO 2021 (90 days). Frequency/Duration: 1 time a week for 90 Day(s) MEDICAL/REFERRING DIAGNOSIS:  Pain in right shoulder [M25.511]  Pain in left shoulder [M25.512]   DATE OF ONSET: chronic, recent flare up  REFERRING PHYSICIAN: Jayda Patel MD MD Orders: evaluate and treat  Return MD Appointment: as needed     Pre-treatment Symptoms/Complaints:  Patient notes improvements since his last visit in pain levels and shoulder ROM   Pain: Initial: Pain Intensity 1: 2  Pain Location 1: Shoulder  Pain Orientation 1: Left, Right /10 Post Session:  1/10   Medications Last Reviewed:  2021  Updated Objective Findings:  Soft tissue restrictions through bilateral pectoralis major/minor, latissimus dorsi, bony contour restrictions around bilateral coracoid process. Presents with elevated sternum and costal cage with thoracic extension   TREATMENT:     THERAPEUTIC EXERCISE: (10 minutes):  Exercises per grid below to improve mobility, strength, balance and coordination. Required moderate visual, verbal, manual and tactile cues to promote proper body alignment, promote proper body posture, promote proper body mechanics and promote proper body breathing techniques. Progressed resistance, range, repetitions and complexity of movement as indicated. Date:  9/20/2021   Activity/Exercise Parameters   Review gym exercise routine X 5 minute review   Pendulums    Isometric shoulder ER    Supine pectoralis stretch on foam roller X 3 reps, 1 minute holds with breathing   Planks HEP   Prone prop scapular stabilization HEP  X 5 reps, prolonged holds with bilateral shoulder ER with t-band   Side lying circles X 5 reps clockwise and counterclockwise. Standing posture    Child pose    Supine and sitting diaphragmatic breathing x 5 reps   Supine 90/90 holds X 5 reps, 10 sec holds, work through phasic shakes. Prone T and I x10 reps, 5 sec holds, overall ball   Prone bilateral I with posterior depression of scapula X 5 reps, 30 sec holds - add weights with HEP   Seated thoracic extension        MANUAL THERAPY: (40 minutes): Joint mobilization and Soft tissue mobilization was utilized and necessary because of the patient's restricted joint motion, painful spasm, loss of articular motion and restricted motion of soft tissue. (Used abbreviations: MET - muscle energy technique; PNF - proprioceptive neuromuscular facilitation; NMR - neuromuscular re-education; a/p - anterior to posterior; p/a - posterior to anterior, FMP - functional movement patterns, SF - Superficial Fascia; BC - Bony contours; MP - muscle play; IASTM - instrument-assisted soft tissue mobilization)  · Supine soft tissue mobilization to anterior chest with breathing techniques - not today  · Supine bilateral pectoralis major/minor soft tissue mobilization with breathing techniques and shoulder ER. - not today  · Supine bilateral cervical spine manual traction and suboccipital release. - not today  · Supine bilateral soft tissue mobilization around bony contours of coracoid process. - not today  · Side lying right for left scapular posterior depression with prolonged holds. Not today  · Supine myofascial decompression to anterior shoulders bilaterally with FMP of shoulder ER.  Not today  · Supine bilateral inferior glides with mobilization belt with inferior glide of clavicle. Not today  · Prone soft tissue mobilization to periscapular muscles and bont contours of groove of spine and medial border of scapula  · Prone superficial fascia along posterior torso with breathing techniques    Dry Needling (5 minutes): With written consent received and precautions reviewed, instrument-assisted soft tissue mobilization was performed to bilateral infraspinatus for the purpose of trigger point release with a positive treatment effect and no complications noted. Dry Needles Used: 4   Dry Needles Removed: 4         [employer] [facility]  Treatment/Session Summary:    · Response to Treatment: improving overall mobility in bilateral shoulders and improving postural stability strength. · Communication/Consultation:  None today  · Equipment provided today:  None today  · Recommendations/Intent for next treatment session: Next visit will focus on soft tissue mobilization of anterior chest and shoulders, strengthening exercises for postural stability, postural training for costal cage positions.     Total Treatment Billable Duration:  55 minute treatment   PT Patient Time In/Time Out  Time In: 1630  Time Out: ELI Lynn    Future Appointments   Date Time Provider Kaushal Beasley   9/24/2021  7:30 AM [facility] RESOURCE [facility]   9/29/2021  9:00 AM Obi Handy MD [facility]   10/4/2021  4:30 PM ELI Rod   10/18/2021  4:30 PM Piotr Miguel, PT [facility] LARSONCounts include [facility] beds at the [facility]

## 2021-10-05 ENCOUNTER — HOSPITAL ENCOUNTER (OUTPATIENT)
Dept: PHYSICAL THERAPY | Age: 61
Discharge: HOME | End: 2021-10-05
Payer: COMMERCIAL

## 2021-10-05 PROCEDURE — 97110 THERAPEUTIC EXERCISES: CPT

## 2021-10-05 PROCEDURE — 97140 MANUAL THERAPY 1/> REGIONS: CPT

## 2021-10-06 NOTE — PROGRESS NOTES
Serge Richter  : 1960  Primary: [facility]  Secondary:  [facility] at [address]  [phone]   [phone]        OUTPATIENT PHYSICAL THERAPY:Recertification    ICD-10: Treatment Diagnosis: pain in joint, right shoulder M25.511  ICD-10: Treatment Diagnosis: pain in joint, left shoulder M25.512  ICD-10: Treatment Diagnosis: stiffness in joint, right shoulder M25.611  ICD-10: Treatment Diagnosis: stiffness in joint, left shoulder M25.612  Precautions/Allergies:   Patient has no known allergies. TREATMENT PLAN:  Effective Dates: 10/5/21 TO 1/3/2022 (90 days). Frequency/Duration: 1 time a week for 90 Day(s) MEDICAL/REFERRING DIAGNOSIS:  Pain in right shoulder [M25.511]  Pain in left shoulder [M25.512]   DATE OF ONSET: chronic, recent flare up  REFERRING PHYSICIAN: Jewel Brown MD MD Orders: evaluate and treat  Return MD Appointment: as needed   RE-CERTIFICATION: 00: Serge Richter has attended 12 out of 12 scheduled visits, with 0 cancellation(s) and 0 no shows. He continues to present with decreased pain levels and improved mobility in bilateral shoulders. His overall ROM is improving, however continues to be restricted in full AROM of bilateral shoulders. He also continues to present with ROM restrictions through his costal cage and thoracic extension and rotation. Overall strength and endurance is improving of his UE and core, however would benefit from continued progressions. He would benefit from continued therapy services at this time. PROGRESS NOTE: 21:  Serge Richter has attended 10 out of 10 scheduled visits, with 0 cancellation(s) and 0 no shows. Overall pain levels have decreased in bilateral shoulders. Continues to be challenged with ROM in bilateral shoulders. He continues to be challenged with prolonged activities secondary to fatigue and lack of motion in his UQ. He would benefit from continued therapy services to improve overall mobility with daily activities. INITIAL ASSESSMENT:  Mr. Ifrah Ramos is a 59 y.o. male who presents to physical therapy with chronic bilateral shoulder pain and stiffness. He is limited in ROM of bilateral shoulders in multiple directions. He presents with significant imbalances between his pectoralis major/minor and biceps compared to his posterior scapular musculature. This imbalance has created deficits in his mobility of his shoulder girdles bilaterally. He would benefit from skilled physical therapy to improve overall mobility of bilateral shoulder girdles, increase ROM of bilateral shoulder glenohumeral joints, decreased soft tissue restrictions and improve balance in UQ musculature. PROBLEM LIST (Impacting functional limitations):  1. Decreased Strength  2. Decreased ADL/Functional Activities  3. Increased Pain  4. Decreased Activity Tolerance  5. Decreased Flexibility/Joint Mobility INTERVENTIONS PLANNED: (Treatment may consist of any combination of the following)  1. Home Exercise Program (HEP)  2. Manual Therapy  3. Neuromuscular Re-education/Strengthening  4. Range of Motion (ROM)  5. Therapeutic Activites  6. Therapeutic Exercise/Strengthening     GOALS: (Goals have been discussed and agreed upon with patient.)    Discharge Goals: Time Frame: 90 days  1. Patient demonstrates independence with his HEP without verbal cueing. GOAL MET, progress as needed  2. Patient demonstrate full functional ROM to bilateral shoulder for ability to perform overhead activity with pain no more than 0-2/10. ONGOING  3. Patient able to sleep for 6 consecutive hours without awoke from bilateral shoulder pain. GOAL MET  4. Improve DASH outcome measure score from 37/55 to 20/55 to perform ADLs ONGOING  5.  Patient demonstrates ability to perform yard work and house hold activities with 0/10 pain and full functional ROM. - ONGOING    OUTCOME MEASURE:   Tool Used: Disabilities of the Arm, Shoulder and Hand (DASH) Questionnaire - Quick Version  Score:  Initial: 37/55  Most Recent: X/55 (Date: -- )   Interpretation of Score: The DASH is designed to measure the activities of daily living in person's with upper extremity dysfunction or pain. Each section is scored on a 1-5 scale, 5 representing the greatest disability. The scores of each section are added together for a total score of 55. MEDICAL NECESSITY:   · Patient is expected to demonstrate progress in strength, range of motion, balance, coordination and functional technique to increase independence with increasing ROM in bilateral shoulder for ability to perform ADLs with improved mobility and decreased pain. REASON FOR SERVICES/OTHER COMMENTS:  · Patient continues to require skilled intervention due to limited ROM in bilateral shoulders preventing him to be able to complete functional daily tasks. .  Total Duration:  PT Patient Time In/Time Out  Time In: 1630  Time Out: 1730    Rehabilitation Potential For Stated Goals: Excellent  Regarding Pritesh TETE ViverosJune's therapy, I certify that the treatment plan above will be carried out by a therapist or under their direction. Thank you for this referral,  Claudia Hebert, PT     Referring Physician Signature: Russ Cerda MD                  PAIN/SUBJECTIVE:   Initial: Pain Intensity 1: 2  Pain Location 1: Shoulder  Pain Orientation 1: Left, Right  Post Session:  7/10   HISTORY:   History of Injury/Illness (Reason for Referral):  Chronic history of right shoulder pain, however recently has noted increased discomfort in bilateral shoulders and limited ability to perform ROM in shoulders, left greater than right. He has stopped going to the gym for a short duration during [place] and now has returned. Notes he is limited with some of his gym exercises secondary to pain and limited mobility.  He also notes discomfort in bilateral shoulders after prolonged sitting and then initial movement after. He also note discomfort with sleeping positions and pain into bilateral shoulders. He recently had an anti-inflammatory and steroid injection and noted 90% improvement in his ROM and pain levels. However, this relief only lasted for about a week. He notes his shoulders feel more like they \"lock up\" and symptoms are slowly progressing. He takes ibuprofen each night, notes if he didn't take it, symptoms would be worse, helps with pain, however does not help with stiffness. Notes clunking in bilateral shoulders. Works out daily and has a routine for different muscle groups each day. Patient denies dizziness, drop attacks, numbness/tingling, bowel/bladder dysfunction and/or unexplained weight gain/loss. Current Medications:       Current Outpatient Medications:     CHARCOAL, by Does Not Apply route., Disp: , Rfl:     OTHER, Indications: Cholrophyll, Disp: , Rfl:     ibuprofen (MOTRIN) 200 mg tablet, Take  by mouth., Disp: , Rfl:     glucosamine (GLUCOSAMINE RELIEF) 1,000 mg tab, Take  by mouth., Disp: , Rfl:    Date Last Reviewed:  10/5/2021     EXAMINATION:   Observation/Orthostatic Postural Assessment:          Shoulder symmetry exhibits bilateral protraction. Shoulder girdles are right elevated compared to left. Scapular position is right elevated compared to left. Clavicles are right elevated compared. Soft tissue observations indicates muscle imbalances between anterior and posterior musculature. Significant restrictions noted in bilateral pectoralis major and minor, biceps. Moderate restrictions in bilateral latissimus dorsi, periscapular musculature. Patient exhibits a decreased cervical lordosis,  neutral thoracic kyphosis, elevated sternum and elevated costal cage with thoracic spine extension.   .  Improving 10/5/2021    Palpation:  Myofascial assessment indicates restrictions in anterior chest.  Improving 10/5/2021  Muscle length testing levator scapulae with ipsilateral arm abduction is moderate restrictions left. Upper trapezius length is mild restrictions bilaterally. Improving 10/5/2021  Pectoralis minor/major and latissimus dorsi length is restrictions bilaterally. Improving 10/5/2021 Scalene muscle length is mild restrictions bilaterally. ROM: Gross active cervical spine rotation is 90% available bilaterally. Apley's scratch test for functional ER/IR is restricted bilaterally: left base of skull/side torso, right: base of skull, side torso . Passive Accessory Motion: Glenohumeral mobility is restricted bilaterally for inferior glide. Acromioclavicular mobility is decreased posterior glides. Improving 10/5/2021 Sternoclavicular mobility is decreased a/p glide. Improving 10/5/2021 Scapulothoracic mobility is restricted bilateral posterior depression. Presents with anterior tilt of costal cage with thoracic extension. AROM(PROM) in degrees Right Left   Shoulder flexion 0-145 0-140   Shoulder abduction (palm down) 0-95 0-95   Shoulder extension 0-20 0-20   Shoulder internal rotation (IR)  (measured at 90 degrees of abduction) 0-15 0-15   ER (measured at 90 degrees of abduction) 0-40 0-30     Strength:   Manual Muscle Test (*/5) Right Left   Shoulder flexion 4+ 4+   Shoulder extension 4+ 4+   Shoulder abduction 4 4   Shoulder adduction 4 4   Shoulder ER 4 4   Shoulder IR 4 4   Upper trapezius 5 5   Middle trapezius 4 4   Lower trapezius 4 4   Rhomboids 4 4   Serratus Anterior 4 4   Elbow flexion 5 5   Elbow extension 4 4   Core stability 4-/5       Functional Mobility:  Challenged with overhead and reaching, challenged with sleeping positions and sitting positions. Improving 10/5/2021       Body Structures Involved:  1. Thoracic Cage  2. Bones  3. Joints  4. Muscles Body Functions Affected:  1. Sensory/Pain  2. Neuromusculoskeletal  3. Movement Related Activities and Participation Affected:  1. General Tasks and Demands  2. Mobility  3. Self Care  4.  Community, Social and [place]

## 2021-10-06 NOTE — PROGRESS NOTES
Gail Aguirre  : 1960  Primary: [facility]  Secondary:  [facility] at [address]  [phone]   [phone]        OUTPATIENT PHYSICAL THERAPY: Daily Treatment Note 10/5/2021  Visit Count:  12    ICD-10: Treatment Diagnosis: pain in joint, right shoulder M25.511  ICD-10: Treatment Diagnosis: pain in joint, left shoulder M25.512  ICD-10: Treatment Diagnosis: stiffness in joint, right shoulder M25.611  ICD-10: Treatment Diagnosis: stiffness in joint, left shoulder M25.612  Precautions/Allergies:   Patient has no known allergies. TREATMENT PLAN:  Effective Dates: 10/5/21 TO 1/3/2022   (90 days). Frequency/Duration: 1 time a week for 90 Day(s) MEDICAL/REFERRING DIAGNOSIS:  Pain in right shoulder [M25.511]  Pain in left shoulder [M25.512]   DATE OF ONSET: chronic, recent flare up  REFERRING PHYSICIAN: Kylie Friedman MD MD Orders: evaluate and treat  Return MD Appointment: as needed     Pre-treatment Symptoms/Complaints:  Patient notes exercises continue to go well at home, less pain and discomfort noted however continues to be challenged with ROM in bilateral shoulders. Pain: Initial: Pain Intensity 1: 2  Pain Location 1: Shoulder  Pain Orientation 1: Left, Right /10 Post Session:  1/10   Medications Last Reviewed:  10/5/2021  Updated Objective Findings:  Soft tissue restrictions through bilateral pectoralis major/minor, latissimus dorsi, bony contour restrictions around bilateral coracoid process. Presents with elevated sternum and costal cage with thoracic extension   TREATMENT:     THERAPEUTIC EXERCISE: (25 minutes):  Exercises per grid below to improve mobility, strength, balance and coordination. Required moderate visual, verbal, manual and tactile cues to promote proper body alignment, promote proper body posture, promote proper body mechanics and promote proper body breathing techniques.   Progressed resistance, range, repetitions and complexity of movement as indicated. Date:  10/5/2021   Activity/Exercise Parameters   Review gym exercise routine X 5 minute review   Pendulums    Isometric shoulder ER    Supine pectoralis stretch on foam roller X 3 reps, 1 minute holds with breathing   Planks HEP   Prone prop scapular stabilization HEP  X 5 reps, prolonged holds with bilateral shoulder ER with t-band   Side lying circles X 5 reps clockwise and counterclockwise. Standing posture    Child pose    Supine and sitting diaphragmatic breathing x 5 reps   Supine 90/90 holds X 5 reps, 10 sec holds, work through phasic shakes. Prone T and I x10 reps, 5 sec holds, overall ball   Prone bilateral I with posterior depression of scapula X 5 reps, 30 sec holds - add weights with HEP   Quadriped arch/sag X 10 reps, slow and controlled, focus on extension in thoracic spine   Side lying thoracic rotation X 10 reps       MANUAL THERAPY: (30 minutes): Joint mobilization and Soft tissue mobilization was utilized and necessary because of the patient's restricted joint motion, painful spasm, loss of articular motion and restricted motion of soft tissue. (Used abbreviations: MET - muscle energy technique; PNF - proprioceptive neuromuscular facilitation; NMR - neuromuscular re-education; a/p - anterior to posterior; p/a - posterior to anterior, FMP - functional movement patterns, SF - Superficial Fascia; BC - Bony contours; MP - muscle play; IASTM - instrument-assisted soft tissue mobilization)  · Supine soft tissue mobilization to anterior chest with breathing techniques -   · Supine bilateral pectoralis major/minor soft tissue mobilization with breathing techniques and shoulder ER. -   · Supine bilateral cervical spine manual traction and suboccipital release. - not today  · Supine bilateral soft tissue mobilization around bony contours of coracoid process.  -   · Side lying right for left scapular posterior depression with prolonged holds. Not today  · Supine myofascial decompression to anterior shoulders bilaterally with FMP of shoulder ER. Not today  · Supine bilateral inferior glides with mobilization belt with inferior glide of clavicle. Not today  · Prone soft tissue mobilization to periscapular muscles and bont contours of groove of spine and medial border of scapula  · Prone superficial fascia along posterior torso with breathing techniques          [facility] Portal  Treatment/Session Summary:    · Response to Treatment: improving awareness of thoracic extension and thoracic rotation. Improving soft tissue mobility in anterior chest and improving balance of shoulder girdles. · Communication/Consultation:  None today  · Equipment provided today:  None today  · Recommendations/Intent for next treatment session: Next visit will focus on soft tissue mobilization of anterior chest and shoulders, strengthening exercises for postural stability, postural training for costal cage positions.     Total Treatment Billable Duration:  55 minute treatment   PT Patient Time In/Time Out  Time In: 1630  Time Out: [facility], PT    Future Appointments   Date Time Provider Kaushal Manei   10/18/2021  4:30 PM David Persaud., PT [facility]   10/5/2022  7:45 AM [facility] LAB RESOURCE [facility]   10/12/2022  8:00 AM Zaid Fuentes MD [facility]

## 2021-10-18 ENCOUNTER — HOSPITAL ENCOUNTER (OUTPATIENT)
Dept: PHYSICAL THERAPY | Age: 61
Discharge: HOME | End: 2021-10-18
Payer: COMMERCIAL

## 2021-10-18 PROCEDURE — 97140 MANUAL THERAPY 1/> REGIONS: CPT

## 2021-10-18 PROCEDURE — 97110 THERAPEUTIC EXERCISES: CPT

## 2021-11-09 ENCOUNTER — HOSPITAL ENCOUNTER (OUTPATIENT)
Dept: PHYSICAL THERAPY | Age: 61
Discharge: HOME | End: 2021-11-09
Payer: COMMERCIAL

## 2021-11-09 PROCEDURE — 97110 THERAPEUTIC EXERCISES: CPT

## 2021-11-09 PROCEDURE — 97140 MANUAL THERAPY 1/> REGIONS: CPT

## 2021-11-10 NOTE — PROGRESS NOTES
Holly Elizabeth  : 1960  Primary: [facility]all[facility]  Secondary:  [facility] at [address]  [phone]   [phone]        OUTPATIENT PHYSICAL THERAPY: Daily Treatment Note 2021  Visit Count:  14    ICD-10: Treatment Diagnosis: pain in joint, right shoulder M25.511  ICD-10: Treatment Diagnosis: pain in joint, left shoulder M25.512  ICD-10: Treatment Diagnosis: stiffness in joint, right shoulder M25.611  ICD-10: Treatment Diagnosis: stiffness in joint, left shoulder M25.612  Precautions/Allergies:   Patient has no known allergies. TREATMENT PLAN:  Effective Dates: 10/5/21 TO 1/3/2022   (90 days). Frequency/Duration: 1 time a week for 90 Day(s) MEDICAL/REFERRING DIAGNOSIS:  Pain in right shoulder [M25.511]  Pain in left shoulder [M25.512]   DATE OF ONSET: chronic, recent flare up  REFERRING PHYSICIAN: Cristy Rosenberg MD MD Orders: evaluate and treat  Return MD Appointment: as needed     Pre-treatment Symptoms/Complaints:  Patient notes bilateral shoulders are feeling a lot better and notes improved ROM. Exercise routine is going really well at home. Pain: Initial: Pain Intensity 1: 2  Pain Location 1: Shoulder  Pain Orientation 1: Left, Right /10 Post Session:  1/10   Medications Last Reviewed:  2021  Updated Objective Findings:  Soft tissue restrictions through bilateral pectoralis major/minor, latissimus dorsi, bony contour restrictions around bilateral coracoid process. Presents with elevated sternum and costal cage with thoracic extension   TREATMENT:     THERAPEUTIC EXERCISE: (15 minutes):  Exercises per grid below to improve mobility, strength, balance and coordination. Required moderate visual, verbal, manual and tactile cues to promote proper body alignment, promote proper body posture, promote proper body mechanics and promote proper body breathing techniques.   Progressed resistance, range, repetitions and complexity of movement as indicated. Date:  11/9/2021   Activity/Exercise Parameters   Review gym exercise routine X 5 minute review   Pendulums    Isometric shoulder ER    Supine pectoralis stretch on foam roller X 3 reps, 1 minute holds with breathing   Planks HEP   Prone prop scapular stabilization HEP  X 5 reps, prolonged holds with bilateral shoulder ER with t-band   Side lying circles X 5 reps clockwise and counterclockwise. Sitting posture X 5 minutes review of sitting positions, neutral pelvic position. Child pose    Supine and sitting diaphragmatic breathing    Supine 90/90 holds    Prone T and I    Prone bilateral I with posterior depression of scapula    Quadriped arch/sag X 10 reps, slow and controlled, focus on extension in thoracic spine   Side lying thoracic rotation X 10 reps, 5 sec holds       MANUAL THERAPY: (40 minutes): Joint mobilization and Soft tissue mobilization was utilized and necessary because of the patient's restricted joint motion, painful spasm, loss of articular motion and restricted motion of soft tissue. (Used abbreviations: MET - muscle energy technique; PNF - proprioceptive neuromuscular facilitation; NMR - neuromuscular re-education; a/p - anterior to posterior; p/a - posterior to anterior, FMP - functional movement patterns, SF - Superficial Fascia; BC - Bony contours; MP - muscle play; IASTM - instrument-assisted soft tissue mobilization)  · Supine soft tissue mobilization to anterior chest with breathing techniques -   · Supine bilateral pectoralis major/minor soft tissue mobilization with breathing techniques and shoulder ER. -   · Supine bilateral cervical spine manual traction and suboccipital release. - not today  · Supine bilateral soft tissue mobilization around bony contours of coracoid process. -   · Side lying right for left scapular posterior depression with prolonged holds.  Not today  · Supine myofascial decompression to anterior shoulders bilaterally with FMP of shoulder ER. Not today  · Supine bilateral inferior glides with mobilization belt with inferior glide of clavicle. Not today  · Side lying bilaterally for myofascial decompression with bilateral shoulder flexion  · Prone tool assisted soft tissue techniques to bilateral shoulder infraspinatus and latissimus dorsi. [facility]  Treatment/Session Summary:    · Response to Treatment: improving overall mobility in bilateral shoulder girdles. Decreased soft tissue restrictions noted bilateral latissimus dorsi  · Communication/Consultation:  None today  · Equipment provided today:  None today  · Recommendations/Intent for next treatment session: Next visit will focus on soft tissue mobilization of anterior chest and shoulders, strengthening exercises for postural stability, postural training for costal cage positions.     Total Treatment Billable Duration:  55 minute treatment   PT Patient Time In/Time Out  Time In: 1630  Time Out: [address], PT    Future Appointments   Date Time Provider Kaushal Beasley   11/23/2021  4:30 PM Ludwig Hyman, PT [facility]   12/7/2021  4:30 PM Payal MAHAJAN, PT [facility]   10/5/2022  7:45 AM F LAB RESOURCE [facility]   10/12/2022  8:00 AM Ward Ocampo MD [facility]

## 2021-11-23 ENCOUNTER — HOSPITAL ENCOUNTER (OUTPATIENT)
Dept: PHYSICAL THERAPY | Age: 61
Discharge: HOME | End: 2021-11-23
Payer: COMMERCIAL

## 2021-11-23 PROCEDURE — 97110 THERAPEUTIC EXERCISES: CPT

## 2021-11-23 PROCEDURE — 97140 MANUAL THERAPY 1/> REGIONS: CPT

## 2021-11-24 NOTE — PROGRESS NOTES
Arie Manny  : 1960  Primary: [facility]*  Secondary:  Therapy Center at [address]  [phone]   [phone]        OUTPATIENT PHYSICAL THERAPY: Daily Treatment Note 2021  Visit Count:  15    ICD-10: Treatment Diagnosis: pain in joint, right shoulder M25.511  ICD-10: Treatment Diagnosis: pain in joint, left shoulder M25.512  ICD-10: Treatment Diagnosis: stiffness in joint, right shoulder M25.611  ICD-10: Treatment Diagnosis: stiffness in joint, left shoulder M25.612  Precautions/Allergies:   Patient has no known allergies. TREATMENT PLAN:  Effective Dates: 10/5/21 TO 1/3/2022   (90 days). Frequency/Duration: 1 time a week for 90 Day(s) MEDICAL/REFERRING DIAGNOSIS:  Pain in right shoulder [M25.511]  Pain in left shoulder [M25.512]   DATE OF ONSET: chronic, recent flare up  REFERRING PHYSICIAN: Agatha Rodriguez MD MD Orders: evaluate and treat  Return MD Appointment: as needed     Pre-treatment Symptoms/Complaints:  Patient notes traveled last week and was not able to do his normal routine of exercises. Notes increased tightness in bilateral shoulders and decreased bilateral shoulder ROM. Pain: Initial: Pain Intensity 1: 3  Pain Location 1: Shoulder  Pain Orientation 1: Left, Right /10 Post Session:  1/10   Medications Last Reviewed:  2021  Updated Objective Findings:  Soft tissue restrictions through bilateral pectoralis major/minor, latissimus dorsi, bony contour restrictions around bilateral coracoid process. Presents with elevated sternum and costal cage with thoracic extension   TREATMENT:     THERAPEUTIC EXERCISE: (25 minutes):  Exercises per grid below to improve mobility, strength, balance and coordination.   Required moderate visual, verbal, manual and tactile cues to promote proper body alignment, promote proper body posture, promote proper body mechanics and promote proper body breathing techniques. Progressed resistance, range, repetitions and complexity of movement as indicated. Date:  11/23/2021   Activity/Exercise Parameters   Review gym exercise routine X 5 minute review   Pendulums    Isometric shoulder ER    Supine pectoralis stretch on foam roller X 3 reps, 1 minute holds with breathing   Planks HEP   Prone prop scapular stabilization HEP  X 5 reps, prolonged holds with bilateral shoulder ER with t-band   Side lying circles X 5 reps clockwise and counterclockwise. Sitting posture X 5 minutes review of sitting positions, neutral pelvic position. Child pose    Supine and sitting diaphragmatic breathing    Supine 90/90 holds    Prone T and I X 10 reps, 10 sec holds   Prone bilateral I with posterior depression of scapula    Quadriped arch/sag X 10 reps, slow and controlled, focus on extension in thoracic spine   Side lying thoracic rotation X 10 reps, 5 sec holds   Patient education X 15 minutes review of sleeping positions, pillows for cervical spine, increase water intake, limit amount of sodas, education about inflammatory foods. MANUAL THERAPY: (30 minutes): Joint mobilization and Soft tissue mobilization was utilized and necessary because of the patient's restricted joint motion, painful spasm, loss of articular motion and restricted motion of soft tissue.    (Used abbreviations: MET - muscle energy technique; PNF - proprioceptive neuromuscular facilitation; NMR - neuromuscular re-education; a/p - anterior to posterior; p/a - posterior to anterior, FMP - functional movement patterns, SF - Superficial Fascia; BC - Bony contours; MP - muscle play; IASTM - instrument-assisted soft tissue mobilization)  · Supine soft tissue mobilization to anterior chest with breathing techniques -   · Supine bilateral pectoralis major/minor soft tissue mobilization with breathing techniques and shoulder ER. -   · Supine bilateral cervical spine manual traction and suboccipital release. - not today  · Supine bilateral soft tissue mobilization around bony contours of coracoid process. -   · Side lying right for left scapular posterior depression with prolonged holds. Not today  · Supine myofascial decompression to anterior shoulders bilaterally with FMP of shoulder ER. Not today  · Supine bilateral inferior glides with mobilization belt with inferior glide of clavicle. Not today  · Side lying bilaterally for myofascial decompression with bilateral shoulder flexion  · Prone tool assisted soft tissue techniques to bilateral shoulder infraspinatus and levator scapula          [facility]  Treatment/Session Summary:    · Response to Treatment: decreasing soft tissue mobilization in bilateral anterior chest and shoulders. Improving ROM in bilateral GH joints after manual techniques. · Communication/Consultation:  None today  · Equipment provided today:  None today  · Recommendations/Intent for next treatment session: Next visit will focus on soft tissue mobilization of anterior chest and shoulders, strengthening exercises for postural stability, postural training for costal cage positions.     Total Treatment Billable Duration:  55 minute treatment   PT Patient Time In/Time Out  Time In: 1630  Time Out: ELI Lynn    Future Appointments   Date Time Provider Kaushal Beasley   12/7/2021  4:30 PM Laron Cooper, PT [facility]   1/3/2022  4:30 PM Eliana MAHAJAN, PT [facility]   10/5/2022  7:45 AM [facility] LAB RESOURCE [facility]   10/12/2022  8:00 AM Coleen Chairez MD [facility]

## 2021-12-07 ENCOUNTER — HOSPITAL ENCOUNTER (OUTPATIENT)
Dept: PHYSICAL THERAPY | Age: 61
Discharge: HOME | End: 2021-12-07
Payer: COMMERCIAL

## 2021-12-07 PROCEDURE — 97140 MANUAL THERAPY 1/> REGIONS: CPT

## 2021-12-07 PROCEDURE — 97110 THERAPEUTIC EXERCISES: CPT

## 2021-12-07 NOTE — PROGRESS NOTES
Kylee Valente  : 1960  Primary: [facility]  Secondary:  Therapy Center at [address]  [phone]   [phone]        OUTPATIENT PHYSICAL THERAPY: Daily Treatment Note 2021  Visit Count:  16    ICD-10: Treatment Diagnosis: pain in joint, right shoulder M25.511  ICD-10: Treatment Diagnosis: pain in joint, left shoulder M25.512  ICD-10: Treatment Diagnosis: stiffness in joint, right shoulder M25.611  ICD-10: Treatment Diagnosis: stiffness in joint, left shoulder M25.612  Precautions/Allergies:   Patient has no known allergies. TREATMENT PLAN:  Effective Dates: 10/5/21 TO 1/3/2022   (90 days). Frequency/Duration: 1 time a week for 90 Day(s) MEDICAL/REFERRING DIAGNOSIS:  Pain in right shoulder [M25.511]  Pain in left shoulder [M25.512]   DATE OF ONSET: chronic, recent flare up  REFERRING PHYSICIAN: Sammi Roque MD MD Orders: evaluate and treat  Return MD Appointment: as needed     Pre-treatment Symptoms/Complaints:  Patient notes he was able to hang [place] lights outside with less shoulder pain. He noted some stiffness and soreness after, however today he is noting less. Pain: Initial: Pain Intensity 1: 3  Pain Location 1: Shoulder  Pain Orientation 1: Left, Right /10 Post Session:  1/10   Medications Last Reviewed:  2021  Updated Objective Findings:  Soft tissue restrictions through bilateral pectoralis major/minor, latissimus dorsi, bony contour restrictions around bilateral coracoid process. Presents with elevated sternum and costal cage with thoracic extension   TREATMENT:     THERAPEUTIC EXERCISE: (15 minutes):  Exercises per grid below to improve mobility, strength, balance and coordination. Required moderate visual, verbal, manual and tactile cues to promote proper body alignment, promote proper body posture, promote proper body mechanics and promote proper body breathing techniques. Progressed resistance, range, repetitions and complexity of movement as indicated. Date:  12/7/2021   Activity/Exercise Parameters   Review gym exercise routine X 5 minute review   Pendulums    Isometric shoulder ER    Supine pectoralis stretch on foam roller X 3 reps, 1 minute holds with breathing   Planks HEP   Prone prop scapular stabilization HEP  X 5 reps, prolonged holds with bilateral shoulder ER with t-band   Side lying circles    Sitting posture X 5 minutes review of sitting positions, neutral pelvic position. Child pose    Supine and sitting diaphragmatic breathing    Supine 90/90 holds    Prone T and I X 10 reps, 10 sec holds   Prone bilateral I with posterior depression of scapula    Quadriped arch/sag X 10 reps, slow and controlled, focus on extension in thoracic spine   Side lying thoracic rotation    Patient education        MANUAL THERAPY: (40 minutes): Joint mobilization and Soft tissue mobilization was utilized and necessary because of the patient's restricted joint motion, painful spasm, loss of articular motion and restricted motion of soft tissue. (Used abbreviations: MET - muscle energy technique; PNF - proprioceptive neuromuscular facilitation; NMR - neuromuscular re-education; a/p - anterior to posterior; p/a - posterior to anterior, FMP - functional movement patterns, SF - Superficial Fascia; BC - Bony contours; MP - muscle play; IASTM - instrument-assisted soft tissue mobilization)  · Supine soft tissue mobilization to anterior chest with breathing techniques -   · Supine bilateral pectoralis major/minor soft tissue mobilization with breathing techniques and shoulder ER. -   · Supine bilateral cervical spine manual traction and suboccipital release. - not today  · Supine bilateral soft tissue mobilization around bony contours of coracoid process. -   · Side lying right for left scapular posterior depression with prolonged holds.  Not today  · Supine myofascial decompression to anterior shoulders bilaterally with FMP of shoulder ER. Not today  · Supine bilateral inferior glides with mobilization belt with inferior glide of clavicle. Not today  · Side lying bilaterally for myofascial decompression with bilateral shoulder flexion  · Prone tool assisted soft tissue techniques to bilateral shoulder infraspinatus and levator scapula    [facility] Portal  Treatment/Session Summary:    · Response to Treatment: improving overall mobility in bilateral shoulder girdles. Decreased soft tissue restrictions noted in bilateral latissimus dorsi  · Communication/Consultation:  None today  · Equipment provided today:  None today  · Recommendations/Intent for next treatment session: Next visit will focus on soft tissue mobilization of anterior chest and shoulders, strengthening exercises for postural stability, postural training for costal cage positions.     Total Treatment Billable Duration:  55 minute treatment   PT Patient Time In/Time Out  Time In: 1630  Time Out: ELI Lynn    Future Appointments   Date Time Provider Kaushal Beasley   1/3/2022  4:30 PM Hasmukh Elias, PT [facility]   10/5/2022  7:45 AM \[facility]\"" LAB RESOURCE [facility]   10/12/2022  8:00 AM Dasia Golden MD [facility]

## 2022-01-03 ENCOUNTER — HOSPITAL ENCOUNTER (OUTPATIENT)
Dept: PHYSICAL THERAPY | Age: 62
Discharge: HOME | End: 2022-01-03
Payer: COMMERCIAL

## 2022-01-03 PROCEDURE — 97140 MANUAL THERAPY 1/> REGIONS: CPT

## 2022-01-03 PROCEDURE — 97110 THERAPEUTIC EXERCISES: CPT

## 2022-01-04 NOTE — PROGRESS NOTES
Juancarlos Fall  : 1960  Primary: [facility]  Secondary:  Therapy Center at [facility] [address] [facility]  [phone]   [phone]        OUTPATIENT PHYSICAL THERAPY: Daily Treatment Note 1/3/2022  Visit Count:  17    ICD-10: Treatment Diagnosis: pain in joint, right shoulder M25.511  ICD-10: Treatment Diagnosis: pain in joint, left shoulder M25.512  ICD-10: Treatment Diagnosis: stiffness in joint, right shoulder M25.611  ICD-10: Treatment Diagnosis: stiffness in joint, left shoulder M25.612  Precautions/Allergies:   Patient has no known allergies. TREATMENT PLAN:  Effective Dates: 1/3/22 TO 3/3/22   (90 days). Frequency/Duration: 1 time a a month for 60 Day(s) MEDICAL/REFERRING DIAGNOSIS:  Pain in right shoulder [M25.511]  Pain in left shoulder [M25.512]   DATE OF ONSET: chronic, recent flare up  REFERRING PHYSICIAN: Anahy Hernandez MD MD Orders: evaluate and treat  Return MD Appointment: as needed     Pre-treatment Symptoms/Complaints:  Patient notes overall feeling better and noting improved ROM in bilateral shoulders. He did have a recent flare up while hunting and attempting to lift a deer. After a few days his symptoms in his right shoulder pain improved. Pain: Initial: Pain Intensity 1: 2  Pain Location 1: Shoulder  Pain Orientation 1: Left,Right /10 Post Session:  1/10   Medications Last Reviewed:  1/3/2022  Updated Objective Findings:  Soft tissue restrictions through bilateral pectoralis major/minor, latissimus dorsi, bony contour restrictions around bilateral coracoid process. Presents with elevated sternum and costal cage with thoracic extension   TREATMENT:     THERAPEUTIC EXERCISE: (25 minutes):  Exercises per grid below to improve mobility, strength, balance and coordination.   Required moderate visual, verbal, manual and tactile cues to promote proper body alignment, promote proper body posture, promote proper body mechanics and promote proper body breathing techniques. Progressed resistance, range, repetitions and complexity of movement as indicated. Date:  1/3/2022   Activity/Exercise Parameters   Review gym exercise routine X 5 minute review   Pendulums    Isometric shoulder ER    Supine pectoralis stretch on foam roller X 3 reps, 1 minute holds with breathing   Planks HEP   Prone prop scapular stabilization HEP  X 5 reps, prolonged holds with bilateral shoulder ER with t-band   Side lying circles    Sitting posture X 5 minutes review of sitting positions, neutral pelvic position. Child pose    Supine and sitting diaphragmatic breathing    Supine 90/90 holds    Prone T and I X 10 reps, 10 sec holds   Prone bilateral I with posterior depression of scapula    Quadriped arch/sag X 10 reps, slow and controlled, focus on extension in thoracic spine   Side lying thoracic rotation    Patient education X 10 minute review of home exercises, increase water intake and decrease soda and gatorade intake. MANUAL THERAPY: (30 minutes): Joint mobilization and Soft tissue mobilization was utilized and necessary because of the patient's restricted joint motion, painful spasm, loss of articular motion and restricted motion of soft tissue.    (Used abbreviations: MET - muscle energy technique; PNF - proprioceptive neuromuscular facilitation; NMR - neuromuscular re-education; a/p - anterior to posterior; p/a - posterior to anterior, FMP - functional movement patterns, SF - Superficial Fascia; BC - Bony contours; MP - muscle play; IASTM - instrument-assisted soft tissue mobilization)  · Supine soft tissue mobilization to anterior chest with breathing techniques -   · Supine bilateral pectoralis major/minor soft tissue mobilization with breathing techniques and shoulder ER. -   · Supine bilateral cervical spine manual traction and suboccipital release. - not today  · Supine bilateral soft tissue mobilization around bony contours of coracoid process. -   · Side lying right for left scapular posterior depression with prolonged holds. Not today  · Supine myofascial decompression to anterior shoulders bilaterally with FMP of shoulder ER. Not today  · Supine bilateral inferior glides with mobilization belt with inferior glide of clavicle. Not today  · Side lying bilaterally for myofascial decompression with bilateral shoulder flexion  · Prone tool assisted soft tissue techniques to bilateral shoulder infraspinatus and latissimus dorsi. [employer] Portal  Treatment/Session Summary:    · Response to Treatment: improving overall mobility in bilateral shoulders and improving awareness of costal cage position and compensation through thoracic extension. · Communication/Consultation:  None today  · Equipment provided today:  None today  · Recommendations/Intent for next treatment session: Next visit will focus on soft tissue mobilization of anterior chest and shoulders, strengthening exercises for postural stability, postural training for costal cage positions. Will follow up in one month.      Total Treatment Billable Duration:  55 minute treatment   PT Patient Time In/Time Out  Time In: 1630  Time Out: ELI Lynn    Future Appointments   Date Time Provider Kaushal Beasley   2/3/2022  4:30 PM Rina May, PT [facility]   3/3/2022  4:30 PM Ju MAHAJAN PT [facility]   10/5/2022  7:45 AM [facility] LAB RESOURCE [facility]   10/12/2022  8:00 AM Jaspreet Brooks MD [facility]

## 2022-01-04 NOTE — PROGRESS NOTES
Karl Flores  : 1960  Primary: Negin Dallas  Secondary:  Therapy Center at [address] [place] [address]  [phone]   [phone]        OUTPATIENT PHYSICAL THERAPY:Recertification 3/7/8798   ICD-10: Treatment Diagnosis: pain in joint, right shoulder M25.511  ICD-10: Treatment Diagnosis: pain in joint, left shoulder M25.512  ICD-10: Treatment Diagnosis: stiffness in joint, right shoulder M25.611  ICD-10: Treatment Diagnosis: stiffness in joint, left shoulder M25.612  Precautions/Allergies:   Patient has no known allergies. TREATMENT PLAN:  Effective Dates: 1/3/2022 TO 3/3/22 (90 days). Frequency/Duration: 1 time a month for 60 Day(s) MEDICAL/REFERRING DIAGNOSIS:  Pain in right shoulder [M25.511]  Pain in left shoulder [M25.512]   DATE OF ONSET: chronic, recent flare up  REFERRING PHYSICIAN: Wendy Lai MD MD Orders: evaluate and treat  Return MD Appointment: as needed     RE-CERTIFICATION: 22: Karl Flores has attended 17 out of 17 scheduled visits, with 0 cancellation(s) and 0 no shows. Sai Reis continues to improve overall ROM in bilateral shoulders. His exercises are going well and improving overall strength in his postural stabilizers and rotator cuffs. He continues to be challenged with end range ROM in bilateral shoulder flexion and ER. He would benefit from continued therapy as we progress him to his independent home exercise program.     RE-CERTIFICATION: : Karl Flores has attended 12 out of 12 scheduled visits, with 0 cancellation(s) and 0 no shows. He continues to present with decreased pain levels and improved mobility in bilateral shoulders. His overall ROM is improving, however continues to be restricted in full AROM of bilateral shoulders. He also continues to present with ROM restrictions through his costal cage and thoracic extension and rotation.  Overall strength and endurance is improving of his UE and core, however would benefit from continued progressions. He would benefit from continued therapy services at this time. PROGRESS NOTE: 9/7/21:  Gio Nash has attended 10 out of 10 scheduled visits, with 0 cancellation(s) and 0 no shows. Overall pain levels have decreased in bilateral shoulders. Continues to be challenged with ROM in bilateral shoulders. He continues to be challenged with prolonged activities secondary to fatigue and lack of motion in his UQ. He would benefit from continued therapy services to improve overall mobility with daily activities. INITIAL ASSESSMENT:  Mr. Eloina Rush is a 59 y.o. male who presents to physical therapy with chronic bilateral shoulder pain and stiffness. He is limited in ROM of bilateral shoulders in multiple directions. He presents with significant imbalances between his pectoralis major/minor and biceps compared to his posterior scapular musculature. This imbalance has created deficits in his mobility of his shoulder girdles bilaterally. He would benefit from skilled physical therapy to improve overall mobility of bilateral shoulder girdles, increase ROM of bilateral shoulder glenohumeral joints, decreased soft tissue restrictions and improve balance in UQ musculature. PROBLEM LIST (Impacting functional limitations):  1. Decreased Strength  2. Decreased ADL/Functional Activities  3. Increased Pain  4. Decreased Activity Tolerance  5. Decreased Flexibility/Joint Mobility INTERVENTIONS PLANNED: (Treatment may consist of any combination of the following)  1. Home Exercise Program (HEP)  2. Manual Therapy  3. Neuromuscular Re-education/Strengthening  4. Range of Motion (ROM)  5. Therapeutic Activites  6. Therapeutic Exercise/Strengthening     GOALS: (Goals have been discussed and agreed upon with patient.)    Discharge Goals: Time Frame: 90 days  1. Patient demonstrates independence with his HEP without verbal cueing. GOAL MET, progress as needed  2.  Patient demonstrate full functional ROM to bilateral shoulder for ability to perform overhead activity with pain no more than 0-2/10. ONGOING  3. Patient able to sleep for 6 consecutive hours without awoke from bilateral shoulder pain. GOAL MET  4. Improve DASH outcome measure score from 37/55 to 20/55 to perform ADLs ONGOING  5. Patient demonstrates ability to perform yard work and house hold activities with 0/10 pain and full functional ROM. - ALMOST MET    OUTCOME MEASURE:   Tool Used: Disabilities of the Arm, Shoulder and Hand (DASH) Questionnaire - Quick Version  Score:  Initial: 37/55  Most Recent: 20/55 (Date: 1-3-22 )   Interpretation of Score: The DASH is designed to measure the activities of daily living in person's with upper extremity dysfunction or pain. Each section is scored on a 1-5 scale, 5 representing the greatest disability. The scores of each section are added together for a total score of 55. MEDICAL NECESSITY:   · Patient is expected to demonstrate progress in strength, range of motion, balance, coordination and functional technique to increase independence with increasing ROM in bilateral shoulder for ability to perform ADLs with improved mobility and decreased pain. REASON FOR SERVICES/OTHER COMMENTS:  · Patient continues to require skilled intervention due to limited ROM in bilateral shoulders preventing him to be able to complete functional daily tasks. .  Total Duration:  PT Patient Time In/Time Out  Time In: 1630  Time Out: 1730    Rehabilitation Potential For Stated Goals: Excellent  Regarding Pritesh Watkins's therapy, I certify that the treatment plan above will be carried out by a therapist or under their direction. Thank you for this referral,  Sana Szymanski, PT     Referring Physician Signature:  Christofer Moreno MD                  PAIN/SUBJECTIVE:   Initial: Pain Intensity 1: 2  Pain Location 1: Shoulder  Pain Orientation 1: Left,Right  Post Session:  7/10   HISTORY: History of Injury/Illness (Reason for Referral):  Chronic history of right shoulder pain, however recently has noted increased discomfort in bilateral shoulders and limited ability to perform ROM in shoulders, left greater than right. He has stopped going to the gym for a short duration during [employer] and now has returned. Notes he is limited with some of his gym exercises secondary to pain and limited mobility. He also notes discomfort in bilateral shoulders after prolonged sitting and then initial movement after. He also note discomfort with sleeping positions and pain into bilateral shoulders. He recently had an anti-inflammatory and steroid injection and noted 90% improvement in his ROM and pain levels. However, this relief only lasted for about a week. He notes his shoulders feel more like they \"lock up\" and symptoms are slowly progressing. He takes ibuprofen each night, notes if he didn't take it, symptoms would be worse, helps with pain, however does not help with stiffness. Notes clunking in bilateral shoulders. Works out daily and has a routine for different muscle groups each day. Patient denies dizziness, drop attacks, numbness/tingling, bowel/bladder dysfunction and/or unexplained weight gain/loss. Current Medications:       Current Outpatient Medications:     CHARCOAL, by Does Not Apply route., Disp: , Rfl:     OTHER, Indications: Cholrophyll, Disp: , Rfl:     ibuprofen (MOTRIN) 200 mg tablet, Take  by mouth., Disp: , Rfl:     glucosamine (GLUCOSAMINE RELIEF) 1,000 mg tab, Take  by mouth., Disp: , Rfl:    Date Last Reviewed:  1/3/2022     EXAMINATION:   Observation/Orthostatic Postural Assessment:          Shoulder symmetry exhibits bilateral protraction. Shoulder girdles are right elevated compared to left. Scapular position is right elevated compared to left. Clavicles are right elevated compared. Soft tissue observations indicates muscle imbalances between anterior and posterior musculature. Significant restrictions noted in bilateral pectoralis major and minor, biceps. Moderate restrictions in bilateral latissimus dorsi, periscapular musculature. Patient exhibits a decreased cervical lordosis,  neutral thoracic kyphosis, elevated sternum and elevated costal cage with thoracic spine extension. .  Improving 1/3/2022    Palpation:  Myofascial assessment indicates restrictions in anterior chest.  Improving 1/3/2022  Muscle length testing levator scapulae with ipsilateral arm abduction is moderate restrictions left. Upper trapezius length is mild restrictions bilaterally. Improving 1/3/2022  Pectoralis minor/major and latissimus dorsi length is restrictions bilaterally. Improving 1/3/2022 Scalene muscle length is mild restrictions bilaterally. ROM: Gross active cervical spine rotation is 90% available bilaterally. Apley's scratch test for functional ER/IR is restricted bilaterally: left base of skull/side torso, right: base of skull, side torso . Passive Accessory Motion: Glenohumeral mobility is restricted bilaterally for inferior glide. Acromioclavicular mobility is decreased posterior glides. Improving 1/3/2022 Sternoclavicular mobility is decreased a/p glide. Improving 1/3/2022 Scapulothoracic mobility is restricted bilateral posterior depression. Presents with anterior tilt of costal cage with thoracic extension.   AROM(PROM) in degrees Right Left   Shoulder flexion 0-155 0-155   Shoulder abduction (palm down) 0-110 0-110   Shoulder extension 0-20 0-20   Shoulder internal rotation (IR)  (measured at 90 degrees of abduction) 0-25 0-25   ER (measured at 90 degrees of abduction) 0-45 0-45     Strength:   Manual Muscle Test (*/5) Right Left   Shoulder flexion 4+ 4+   Shoulder extension 4+ 4+   Shoulder abduction 4 4   Shoulder adduction 4 4   Shoulder ER 4 4   Shoulder IR 4 4   Upper trapezius 5 5   Middle trapezius 4 4   Lower trapezius 4 4   Rhomboids 4 4   Serratus Anterior 4 4   Elbow flexion 5 5   Elbow extension 4 4   Core stability 4-/5       Functional Mobility:  Challenged with overhead and reaching, challenged with sleeping positions and sitting positions. Improving 1/3/2022       Body Structures Involved:  1. Thoracic Cage  2. Bones  3. Joints  4. Muscles Body Functions Affected:  1. Sensory/Pain  2. Neuromusculoskeletal  3. Movement Related Activities and Participation Affected:  1. General Tasks and Demands  2. Mobility  3. Self Care  4.  Community, Social and [place]

## 2022-02-03 ENCOUNTER — HOSPITAL ENCOUNTER (OUTPATIENT)
Dept: PHYSICAL THERAPY | Age: 62
Discharge: HOME | End: 2022-02-03
Payer: COMMERCIAL

## 2022-02-03 PROCEDURE — 97110 THERAPEUTIC EXERCISES: CPT

## 2022-02-03 PROCEDURE — 97140 MANUAL THERAPY 1/> REGIONS: CPT

## 2022-02-04 NOTE — PROGRESS NOTES
Leonor Li  : 1960  Primary: [facility]  Secondary:  [address] at [address]  [phone]   [phone]        OUTPATIENT PHYSICAL THERAPY: Daily Treatment Note 2/3/2022  Visit Count:  18    ICD-10: Treatment Diagnosis: pain in joint, right shoulder M25.511  ICD-10: Treatment Diagnosis: pain in joint, left shoulder M25.512  ICD-10: Treatment Diagnosis: stiffness in joint, right shoulder M25.611  ICD-10: Treatment Diagnosis: stiffness in joint, left shoulder M25.612  Precautions/Allergies:   Patient has no known allergies. TREATMENT PLAN:  Effective Dates: 1/3/22 TO 3/3/22   (90 days). Frequency/Duration: 1 time a a month for 60 Day(s) MEDICAL/REFERRING DIAGNOSIS:  Pain in right shoulder [M25.511]  Pain in left shoulder [M25.512]   DATE OF ONSET: chronic, recent flare up  REFERRING PHYSICIAN: Brad Edmond MD MD Orders: evaluate and treat  Return MD Appointment: as needed     Pre-treatment Symptoms/Complaints:  Patient notes exercises and stretch routines are going well at home. Still notes some challenges with end range motion and with certain reaching motions. Pain: Initial: Pain Intensity 1: 2  Pain Location 1: Shoulder  Pain Orientation 1: Left,Right /10 Post Session:  1/10   Medications Last Reviewed:  2/3/2022  Updated Objective Findings:  Soft tissue restrictions through bilateral pectoralis major/minor, latissimus dorsi, bony contour restrictions around bilateral coracoid process. Presents with elevated sternum and costal cage with thoracic extension   TREATMENT:     THERAPEUTIC EXERCISE: (15 minutes):  Exercises per grid below to improve mobility, strength, balance and coordination. Required moderate visual, verbal, manual and tactile cues to promote proper body alignment, promote proper body posture, promote proper body mechanics and promote proper body breathing techniques.   Progressed resistance, range, repetitions and complexity of movement as indicated. Date:  2/3/2022   Activity/Exercise Parameters   Review gym exercise routine X 5 minute review   Pendulums    Isometric shoulder ER    Supine pectoralis stretch on foam roller X 3 reps, 1 minute holds with breathing   Planks HEP   Prone prop scapular stabilization HEP   Side lying circles    Sitting posture    Child pose    Supine and sitting diaphragmatic breathing    Supine 90/90 holds    Prone T and I X 10 reps, 10 sec holds   Prone bilateral I with posterior depression of scapula    Quadriped arch/sag X 10 reps, slow and controlled, focus on extension in thoracic spine   Side lying thoracic rotation    Patient education X 5 minute review of home exercises, increase water intake and decrease soda and gatorade intake. Continued encouragement to increase water intake daily and to increase cardio with his daily work outs. MANUAL THERAPY: (40 minutes): Joint mobilization and Soft tissue mobilization was utilized and necessary because of the patient's restricted joint motion, painful spasm, loss of articular motion and restricted motion of soft tissue. (Used abbreviations: MET - muscle energy technique; PNF - proprioceptive neuromuscular facilitation; NMR - neuromuscular re-education; a/p - anterior to posterior; p/a - posterior to anterior, FMP - functional movement patterns, SF - Superficial Fascia; BC - Bony contours; MP - muscle play; IASTM - instrument-assisted soft tissue mobilization)  · Supine soft tissue mobilization to anterior chest with breathing techniques -   · Supine bilateral pectoralis major/minor soft tissue mobilization with breathing techniques and shoulder ER. -   · Supine bilateral cervical spine manual traction and suboccipital release. - not today  · Supine bilateral soft tissue mobilization around bony contours of coracoid process. -   · Side lying right for left scapular posterior depression with prolonged holds.  Not today  · Supine myofascial decompression to anterior shoulders bilaterally with FMP of shoulder ER. Not today  · Supine bilateral inferior glides with mobilization belt with inferior glide of clavicle. Not today  · Side lying bilaterally for myofascial decompression with bilateral shoulder flexion  · Prone tool assisted soft tissue techniques to bilateral shoulder infraspinatus and latissimus dorsi. [employer] Portal  Treatment/Session Summary:    · Response to Treatment: improving mobility in bilateral shoulder girdles at end of session today, overall improving postural stability strength. · Communication/Consultation:  None today  · Equipment provided today:  None today  · Recommendations/Intent for next treatment session: Next visit will focus on soft tissue mobilization of anterior chest and shoulders, strengthening exercises for postural stability, postural training for costal cage positions. Will follow up in one month.      Total Treatment Billable Duration:  55 minute treatment   PT Patient Time In/Time Out  Time In: 1630  Time Out: ELI Lynn    Future Appointments   Date Time Provider Kaushal Beasley   3/3/2022  4:30 PM ELI Meneses   10/5/2022  7:45 AM [facility]   10/12/2022  8:00 AM Melanie Swanson MD [facility]

## 2022-03-03 ENCOUNTER — HOSPITAL ENCOUNTER (OUTPATIENT)
Dept: PHYSICAL THERAPY | Age: 62
Discharge: HOME | End: 2022-03-03
Payer: COMMERCIAL

## 2022-03-03 PROCEDURE — 97110 THERAPEUTIC EXERCISES: CPT

## 2022-03-03 PROCEDURE — 97140 MANUAL THERAPY 1/> REGIONS: CPT

## 2022-03-04 NOTE — THERAPY DISCHARGE
Jp Davenport  : 1960  Primary: Sonali Crook Rpn  Secondary:  [address] at [address], [address]  [phone]   [phone]        OUTPATIENT PHYSICAL THERAPY:Discharge Summary 3/3/2022   ICD-10: Treatment Diagnosis: pain in joint, right shoulder M25.511  ICD-10: Treatment Diagnosis: pain in joint, left shoulder M25.512  ICD-10: Treatment Diagnosis: stiffness in joint, right shoulder M25.611  ICD-10: Treatment Diagnosis: stiffness in joint, left shoulder M25.612  Precautions/Allergies:   Patient has no known allergies. TREATMENT PLAN:  Effective Dates: 1/3/2022 TO 3/3/22 (90 days). Frequency/Duration: 1 time a month for 60 Day(s) MEDICAL/REFERRING DIAGNOSIS:  Pain in right shoulder [M25.511]  Pain in left shoulder [M25.512]   DATE OF ONSET: chronic, recent flare up  REFERRING PHYSICIAN: Laura Wills MD MD Orders: evaluate and treat  Return MD Appointment: as needed     DISCHARGE SUMMARY: 3/3/22:  Jp Davenport has attended 23 out of 19 scheduled visits from 21 to 3/3/22, with 0 cancellation(s) and 0 no shows. He has improved his overall bilateral shoulder ROM and improved awareness of standing and sitting posture. He continues to present with an elevated sternum and costal cage, however significant improvements and awareness noted. He has met his physical therapy goals at this time and will be discharged to his independent exercise program.     RE-CERTIFICATION: 46: Jp Davenport has attended 17 out of 17 scheduled visits, with 0 cancellation(s) and 0 no shows. Star Broussard continues to improve overall ROM in bilateral shoulders. His exercises are going well and improving overall strength in his postural stabilizers and rotator cuffs. He continues to be challenged with end range ROM in bilateral shoulder flexion and ER.  He would benefit from continued therapy as we progress him to his independent home exercise program. RE-CERTIFICATION: 24/9/22: Griselda Malone has attended 12 out of 12 scheduled visits, with 0 cancellation(s) and 0 no shows. He continues to present with decreased pain levels and improved mobility in bilateral shoulders. His overall ROM is improving, however continues to be restricted in full AROM of bilateral shoulders. He also continues to present with ROM restrictions through his costal cage and thoracic extension and rotation. Overall strength and endurance is improving of his UE and core, however would benefit from continued progressions. He would benefit from continued therapy services at this time. PROGRESS NOTE: 9/7/21:  Griselda Malone has attended 10 out of 10 scheduled visits, with 0 cancellation(s) and 0 no shows. Overall pain levels have decreased in bilateral shoulders. Continues to be challenged with ROM in bilateral shoulders. He continues to be challenged with prolonged activities secondary to fatigue and lack of motion in his UQ. He would benefit from continued therapy services to improve overall mobility with daily activities. INITIAL ASSESSMENT:  Mr. Sammi Hardy is a 57 y.o. male who presents to physical therapy with chronic bilateral shoulder pain and stiffness. He is limited in ROM of bilateral shoulders in multiple directions. He presents with significant imbalances between his pectoralis major/minor and biceps compared to his posterior scapular musculature. This imbalance has created deficits in his mobility of his shoulder girdles bilaterally. He would benefit from skilled physical therapy to improve overall mobility of bilateral shoulder girdles, increase ROM of bilateral shoulder glenohumeral joints, decreased soft tissue restrictions and improve balance in UQ musculature. GOALS: (Goals have been discussed and agreed upon with patient.)    Discharge Goals: Time Frame: 90 days  1. Patient demonstrates independence with his HEP without verbal cueing.  GOAL MET, progress as needed  2. Patient demonstrate full functional ROM to bilateral shoulder for ability to perform overhead activity with pain no more than 0-2/10. Functional ROM MET  3. Patient able to sleep for 6 consecutive hours without awoke from bilateral shoulder pain. GOAL MET  4. Improve DASH outcome measure score from 37/55 to 20/55 to perform ADLs GOAL EMT  5. Patient demonstrates ability to perform yard work and house hold activities with 0/10 pain and full functional ROM. - GOAL MET    OUTCOME MEASURE:   Tool Used: Disabilities of the Arm, Shoulder and Hand (DASH) Questionnaire - Quick Version  Score:  Initial: 37/55  Most Recent: 20/55 (Date: 1-3-22 )                       15/55 (Date: 3-3-22)   Interpretation of Score: The DASH is designed to measure the activities of daily living in person's with upper extremity dysfunction or pain. Each section is scored on a 1-5 scale, 5 representing the greatest disability. The scores of each section are added together for a total score of 55. MEDICAL NECESSITY:   · Patient is expected to demonstrate progress in strength, range of motion, balance, coordination and functional technique to increase independence with increasing ROM in bilateral shoulder for ability to perform ADLs with improved mobility and decreased pain. Total Duration:  PT Patient Time In/Time Out  Time In: 1630  Time Out: 4423    Thank you for this referral,  Karena Flores, PT     Referring Physician Signature: Jersey Sanchez MD                  PAIN/SUBJECTIVE:   Initial: Pain Intensity 1: 1  Pain Location 1: Shoulder  Pain Orientation 1: Left,Right  Post Session:  1/10   HISTORY:   History of Injury/Illness (Reason for Referral):  Chronic history of right shoulder pain, however recently has noted increased discomfort in bilateral shoulders and limited ability to perform ROM in shoulders, left greater than right.  He has stopped going [facility] and now has returned. Notes he is limited with some of his gym exercises secondary to pain and limited mobility. He also notes discomfort in bilateral shoulders after prolonged sitting and then initial movement after. He also note discomfort with sleeping positions and pain into bilateral shoulders. He recently had an anti-inflammatory and steroid injection and noted 90% improvement in his ROM and pain levels. However, this relief only lasted for about a week. He notes his shoulders feel more like they \"lock up\" and symptoms are slowly progressing. He takes ibuprofen each night, notes if he didn't take it, symptoms would be worse, helps with pain, however does not help with stiffness. Notes clunking in bilateral shoulders. Works out daily and has a routine for different muscle groups each day. Patient denies dizziness, drop attacks, numbness/tingling, bowel/bladder dysfunction and/or unexplained weight gain/loss. Current Medications:       Current Outpatient Medications:     CHARCOAL, by Does Not Apply route., Disp: , Rfl:     OTHER, Indications: Cholrophyll, Disp: , Rfl:     ibuprofen (MOTRIN) 200 mg tablet, Take  by mouth., Disp: , Rfl:     glucosamine (GLUCOSAMINE RELIEF) 1,000 mg tab, Take  by mouth., Disp: , Rfl:    Date Last Reviewed:  3/3/2022     EXAMINATION:   Observation/Orthostatic Postural Assessment:          Shoulder symmetry exhibits bilateral protraction. Shoulder girdles are right elevated compared to left. Scapular position is right elevated compared to left. Clavicles are right elevated compared. Soft tissue observations indicates muscle imbalances between anterior and posterior musculature. Significant restrictions noted in bilateral pectoralis major and minor, biceps. Moderate restrictions in bilateral latissimus dorsi, periscapular musculature. Patient exhibits a decreased cervical lordosis,  neutral thoracic kyphosis, elevated sternum and elevated costal cage with thoracic spine extension. Jose Manuel Sen Improving 3/3/2022    Palpation:  Myofascial assessment indicates restrictions in anterior chest.  Improving 3/3/2022  Muscle length testing levator scapulae with ipsilateral arm abduction is moderate restrictions left. Upper trapezius length is mild restrictions bilaterally. Improving 3/3/2022  Pectoralis minor/major and latissimus dorsi length is restrictions bilaterally. Improving 3/3/2022 Scalene muscle length is mild restrictions bilaterally. ROM: Gross active cervical spine rotation is 90% available bilaterally. Apley's scratch test for functional ER/IR is restricted bilaterally: left base of skull/side torso, right: base of skull, side torso . Passive Accessory Motion: Glenohumeral mobility is restricted bilaterally for inferior glide. Acromioclavicular mobility is decreased posterior glides. Improving 3/3/2022 Sternoclavicular mobility is decreased a/p glide. Improving 3/3/2022 Scapulothoracic mobility is restricted bilateral posterior depression. Presents with anterior tilt of costal cage with thoracic extension. AROM(PROM) in degrees Right Left   Shoulder flexion 0-155 0-155   Shoulder abduction (palm down) 0-110 0-110   Shoulder extension 0-20 0-20   Shoulder internal rotation (IR)  (measured at 90 degrees of abduction) 0-30 0-30   ER (measured at 90 degrees of abduction) 0-55 0-55     Strength:   Manual Muscle Test (*/5) Right Left   Shoulder flexion 4+ 4+   Shoulder extension 4+ 4+   Shoulder abduction 4+ 4+   Shoulder adduction 4+ 4+   Shoulder ER 4+ 4+   Shoulder IR 4+ 4+   Upper trapezius 5 5   Middle trapezius 4+ 4+   Lower trapezius 4+ 4+   Rhomboids 4 4   Serratus Anterior 4 4   Elbow flexion 5 5   Elbow extension 4 4   Core stability 4/5       Functional Mobility:  Challenged with overhead and reaching, challenged with sleeping positions and sitting positions. Improving 3/3/2022       Body Structures Involved:  1. Thoracic Cage  2. Bones  3. Joints  4.  Muscles Body Functions Affected:  1. Sensory/Pain  2. Neuromusculoskeletal  3. Movement Related Activities and Participation Affected:  1. General Tasks and Demands  2. Mobility  3. Self Care  4.  Community, Social and Barwick Solomon

## 2022-03-04 NOTE — PROGRESS NOTES
Doc Muñiz  : 1960  Primary: Shasha Gold Myra Rpn  Secondary:  Therapy Center at [address] [place] [address]  [phone]   [phone]        OUTPATIENT PHYSICAL THERAPY: Daily Treatment Note 3/3/2022  Visit Count:  19    ICD-10: Treatment Diagnosis: pain in joint, right shoulder M25.511  ICD-10: Treatment Diagnosis: pain in joint, left shoulder M25.512  ICD-10: Treatment Diagnosis: stiffness in joint, right shoulder M25.611  ICD-10: Treatment Diagnosis: stiffness in joint, left shoulder M25.612  Precautions/Allergies:   Patient has no known allergies. TREATMENT PLAN:  Effective Dates: 1/3/22 TO 3/3/22   (90 days). Frequency/Duration: 1 time a a month for 60 Day(s) MEDICAL/REFERRING DIAGNOSIS:  Pain in right shoulder [M25.511]  Pain in left shoulder [M25.512]   DATE OF ONSET: chronic, recent flare up  REFERRING PHYSICIAN: Chase Bloch, MD MD Orders: evaluate and treat  Return MD Appointment: as needed     Pre-treatment Symptoms/Complaints:  Patient notes improving his daily exercise routine to balance out exercises and muscle groups. Notes little to no pain, only notes stiffness at this time. Pain: Initial: Pain Intensity 1: 1  Pain Location 1: Shoulder  Pain Orientation 1: Left,Right /10 Post Session:  1/10   Medications Last Reviewed:  3/3/2022  Updated Objective Findings:   Presents with elevated sternum and costal cage with thoracic extension   TREATMENT:     THERAPEUTIC EXERCISE: (25 minutes):  Exercises per grid below to improve mobility, strength, balance and coordination. Required moderate visual, verbal, manual and tactile cues to promote proper body alignment, promote proper body posture, promote proper body mechanics and promote proper body breathing techniques. Progressed resistance, range, repetitions and complexity of movement as indicated.    Date:  3/3/2022   Activity/Exercise Parameters   Review gym exercise routine X10 minute review   Pendulums    Isometric shoulder ER    Supine pectoralis stretch on foam roller X 3 reps, 1 minute holds with breathing   Planks HEP   Prone prop scapular stabilization HEP   Side lying circles    Sitting posture    Child pose    Supine and sitting diaphragmatic breathing    Supine 90/90 holds    Prone T and I X 10 reps, 10 sec holds   Prone bilateral I with posterior depression of scapula    Quadriped arch/sag X 10 reps, slow and controlled, focus on extension in thoracic spine   Side lying thoracic rotation    Patient education X 5 minute review of home exercises, increase water intake and decrease soda and gatorade intake. Continued encouragement to increase water intake daily and to increase cardio with his daily work outs. Posture review X 5 minute review of costal cage in neutral in standing and supine with gym exercises. MANUAL THERAPY: (30 minutes): Joint mobilization and Soft tissue mobilization was utilized and necessary because of the patient's restricted joint motion, painful spasm, loss of articular motion and restricted motion of soft tissue. (Used abbreviations: MET - muscle energy technique; PNF - proprioceptive neuromuscular facilitation; NMR - neuromuscular re-education; a/p - anterior to posterior; p/a - posterior to anterior, FMP - functional movement patterns, SF - Superficial Fascia; BC - Bony contours; MP - muscle play; IASTM - instrument-assisted soft tissue mobilization)  · Supine soft tissue mobilization to anterior chest with breathing techniques -   · Supine bilateral pectoralis major/minor soft tissue mobilization with breathing techniques and shoulder ER. -   · Supine bilateral cervical spine manual traction and suboccipital release. - not today  · Supine bilateral soft tissue mobilization around bony contours of coracoid process. -   · Prone soft tissue mobilization to thoracic spine paraspinals.         [facility]  Treatment/Session Summary:    · Response to Treatment: decreased soft tissue restrictions noted in anterior chest and shoulders. Demonstrates improvement in postural stabilizer strength. Independent with HEP, will be discharged from therapy today.    · Communication/Consultation:  None today  · Equipment provided today:  None today      Total Treatment Billable Duration:  55 minute treatment   PT Patient Time In/Time Out  Time In: 1630  Time Out: ELI Lynn    Future Appointments   Date Time Provider Kaushal Beasley   10/5/2022  7:45 AM [facility] LAB RESOURCE [facility]   10/12/2022  8:00 AM Melanie Swanson MD [facility]

## 2022-10-04 DIAGNOSIS — Z00.00 ANNUAL PHYSICAL EXAM: Primary | ICD-10-CM

## 2022-10-05 ENCOUNTER — NURSE ONLY (OUTPATIENT)
Dept: FAMILY MEDICINE CLINIC | Facility: CLINIC | Age: 62
End: 2022-10-05

## 2022-10-05 DIAGNOSIS — Z00.00 ANNUAL PHYSICAL EXAM: ICD-10-CM

## 2022-10-05 LAB
APPEARANCE UR: CLEAR
BACTERIA URNS QL MICRO: NEGATIVE /HPF
BASOPHILS # BLD: 0 K/UL (ref 0–0.2)
BASOPHILS NFR BLD: 1 % (ref 0–2)
BILIRUB UR QL: NEGATIVE
CASTS URNS QL MICRO: ABNORMAL /LPF
COLOR UR: ABNORMAL
DIFFERENTIAL METHOD BLD: NORMAL
EOSINOPHIL # BLD: 0.2 K/UL (ref 0–0.8)
EOSINOPHIL NFR BLD: 4 % (ref 0.5–7.8)
EPI CELLS #/AREA URNS HPF: ABNORMAL /HPF
ERYTHROCYTE [DISTWIDTH] IN BLOOD BY AUTOMATED COUNT: 12 % (ref 11.9–14.6)
GLUCOSE UR STRIP.AUTO-MCNC: NEGATIVE MG/DL
HCT VFR BLD AUTO: 44.9 % (ref 41.1–50.3)
HGB BLD-MCNC: 14.7 G/DL (ref 13.6–17.2)
HGB UR QL STRIP: NEGATIVE
IMM GRANULOCYTES # BLD AUTO: 0 K/UL (ref 0–0.5)
IMM GRANULOCYTES NFR BLD AUTO: 0 % (ref 0–5)
KETONES UR QL STRIP.AUTO: NEGATIVE MG/DL
LEUKOCYTE ESTERASE UR QL STRIP.AUTO: NEGATIVE
LYMPHOCYTES # BLD: 1.4 K/UL (ref 0.5–4.6)
LYMPHOCYTES NFR BLD: 25 % (ref 13–44)
MCH RBC QN AUTO: 31.7 PG (ref 26.1–32.9)
MCHC RBC AUTO-ENTMCNC: 32.7 G/DL (ref 31.4–35)
MCV RBC AUTO: 96.8 FL (ref 79.6–97.8)
MONOCYTES # BLD: 0.5 K/UL (ref 0.1–1.3)
MONOCYTES NFR BLD: 10 % (ref 4–12)
MUCOUS THREADS URNS QL MICRO: 0 /LPF
NEUTS SEG # BLD: 3.2 K/UL (ref 1.7–8.2)
NEUTS SEG NFR BLD: 60 % (ref 43–78)
NITRITE UR QL STRIP.AUTO: NEGATIVE
NRBC # BLD: 0 K/UL (ref 0–0.2)
PH UR STRIP: 6 [PH] (ref 5–9)
PLATELET # BLD AUTO: 188 K/UL (ref 150–450)
PMV BLD AUTO: 11.6 FL (ref 9.4–12.3)
PROT UR STRIP-MCNC: NEGATIVE MG/DL
RBC # BLD AUTO: 4.64 M/UL (ref 4.23–5.6)
RBC #/AREA URNS HPF: ABNORMAL /HPF
SP GR UR REFRACTOMETRY: 1.01 (ref 1–1.02)
URINE CULTURE IF INDICATED: ABNORMAL
UROBILINOGEN UR QL STRIP.AUTO: 0.2 EU/DL (ref 0.2–1)
WBC # BLD AUTO: 5.4 K/UL (ref 4.3–11.1)
WBC URNS QL MICRO: ABNORMAL /HPF

## 2022-10-06 LAB
ALBUMIN SERPL-MCNC: 3.9 G/DL (ref 3.2–4.6)
ALBUMIN/GLOB SERPL: 1.3 {RATIO} (ref 1.2–3.5)
ALP SERPL-CCNC: 83 U/L (ref 50–136)
ALT SERPL-CCNC: 36 U/L (ref 12–65)
ANION GAP SERPL CALC-SCNC: 5 MMOL/L (ref 4–13)
AST SERPL-CCNC: 20 U/L (ref 15–37)
BILIRUB SERPL-MCNC: 0.5 MG/DL (ref 0.2–1.1)
BUN SERPL-MCNC: 18 MG/DL (ref 8–23)
CALCIUM SERPL-MCNC: 8.9 MG/DL (ref 8.3–10.4)
CHLORIDE SERPL-SCNC: 104 MMOL/L (ref 101–110)
CHOLEST SERPL-MCNC: 152 MG/DL
CO2 SERPL-SCNC: 28 MMOL/L (ref 21–32)
CREAT SERPL-MCNC: 1.2 MG/DL (ref 0.8–1.5)
GLOBULIN SER CALC-MCNC: 2.9 G/DL (ref 2.3–3.5)
GLUCOSE SERPL-MCNC: 91 MG/DL (ref 65–100)
HDLC SERPL-MCNC: 42 MG/DL (ref 40–60)
HDLC SERPL: 3.6 {RATIO}
LDLC SERPL CALC-MCNC: 87.6 MG/DL
POTASSIUM SERPL-SCNC: 4.2 MMOL/L (ref 3.5–5.1)
PROT SERPL-MCNC: 6.8 G/DL (ref 6.3–8.2)
PSA SERPL-MCNC: 1.8 NG/ML
SODIUM SERPL-SCNC: 137 MMOL/L (ref 136–145)
TRIGL SERPL-MCNC: 112 MG/DL (ref 35–150)
TSH, 3RD GENERATION: 4.27 UIU/ML (ref 0.36–3.74)
VLDLC SERPL CALC-MCNC: 22.4 MG/DL (ref 6–23)

## 2022-10-12 ENCOUNTER — OFFICE VISIT (OUTPATIENT)
Dept: FAMILY MEDICINE CLINIC | Facility: CLINIC | Age: 62
End: 2022-10-12
Payer: COMMERCIAL

## 2022-10-12 VITALS
HEART RATE: 56 BPM | BODY MASS INDEX: 30.78 KG/M2 | TEMPERATURE: 97.3 F | DIASTOLIC BLOOD PRESSURE: 72 MMHG | SYSTOLIC BLOOD PRESSURE: 120 MMHG | HEIGHT: 70 IN | OXYGEN SATURATION: 97 % | WEIGHT: 215 LBS

## 2022-10-12 DIAGNOSIS — M25.519 ARTHRALGIA OF SHOULDER, UNSPECIFIED LATERALITY: ICD-10-CM

## 2022-10-12 DIAGNOSIS — Z00.00 ANNUAL PHYSICAL EXAM: ICD-10-CM

## 2022-10-12 DIAGNOSIS — Z98.890 H/O MELANOMA EXCISION: ICD-10-CM

## 2022-10-12 DIAGNOSIS — I25.83 CORONARY ARTERY DISEASE DUE TO LIPID RICH PLAQUE: ICD-10-CM

## 2022-10-12 DIAGNOSIS — Z23 FLU VACCINE NEED: Primary | ICD-10-CM

## 2022-10-12 DIAGNOSIS — I25.10 CORONARY ARTERY DISEASE DUE TO LIPID RICH PLAQUE: ICD-10-CM

## 2022-10-12 DIAGNOSIS — Z85.820 H/O MELANOMA EXCISION: ICD-10-CM

## 2022-10-12 PROCEDURE — 90471 IMMUNIZATION ADMIN: CPT | Performed by: FAMILY MEDICINE

## 2022-10-12 PROCEDURE — 90674 CCIIV4 VAC NO PRSV 0.5 ML IM: CPT | Performed by: FAMILY MEDICINE

## 2022-10-12 PROCEDURE — 99396 PREV VISIT EST AGE 40-64: CPT | Performed by: FAMILY MEDICINE

## 2022-10-12 ASSESSMENT — PATIENT HEALTH QUESTIONNAIRE - PHQ9
SUM OF ALL RESPONSES TO PHQ9 QUESTIONS 1 & 2: 0
SUM OF ALL RESPONSES TO PHQ QUESTIONS 1-9: 0
2. FEELING DOWN, DEPRESSED OR HOPELESS: 0
1. LITTLE INTEREST OR PLEASURE IN DOING THINGS: 0

## 2022-10-23 ASSESSMENT — ENCOUNTER SYMPTOMS
RESPIRATORY NEGATIVE: 1
CHEST TIGHTNESS: 0
GASTROINTESTINAL NEGATIVE: 1
EYES NEGATIVE: 1

## 2022-10-23 NOTE — PROGRESS NOTES
HISTORY OF PRESENT ILLNESS  Maame Ralph is a 57 y.o. y.o. male    Other  This is a new (physical) problem. The current episode started today. The problem has been gradually improving. Pertinent negatives include no chest pain. Shoulder Pain   This is a recurrent problem. The problem has been gradually improving. Coronary Artery Disease  Presents for follow-up (calcium scan 53 in 2012) visit. Pertinent negatives include no chest pain, chest pressure, chest tightness, dizziness, leg swelling, muscle weakness or palpitations. The symptoms have been stable. No Known Allergies     Current Outpatient Medications   Medication Sig    Glucosamine Sulfate 1000 MG TABS Take by mouth    ibuprofen (ADVIL;MOTRIN) 200 MG tablet Take by mouth     No current facility-administered medications for this visit.         Past Medical History:   Diagnosis Date    Cancer ([address] 06/19/2018    melanoma of neck    Cervicalgia     Pain in joint, ankle and foot     Pain in joint, shoulder region 1/24/2014    Routine general medical examination at a health care facility     Screening for lipoid disorders     Special screening for malignant neoplasm of prostate     Unspecified deformity of finger         Past Surgical History:   Procedure Laterality Date    COLONOSCOPY  2008    COLONOSCOPY N/A 10/22/2018    COLONOSCOPY  BMI 33 performed by Rene Villanueva MD at [facility] ENDOSCOPY    COLONOSCOPY FLX DX W/COLLJ SPEC WHEN PFRMD  10/22/2018         HEENT  06/19/2018    melanoma of neck excised    OTHER SURGICAL HISTORY  06/19/2018    Patient had Melanoma removed right side of neck        Social History     Socioeconomic History    Marital status:      Spouse name: Not on file    Number of children: Not on file    Years of education: Not on file    Highest education level: Not on file   Occupational History    Not on file   Tobacco Use    Smoking status: Former     Packs/day: 0.50     Types: Cigarettes     Quit date: 1/1/1990 Years since quittin.8    Smokeless tobacco: Never   Substance and Sexual Activity    Alcohol use: No    Drug use: No    Sexual activity: Not on file   Other Topics Concern    Not on file   Social History Narrative    Not on file     Social Determinants of Health     Financial Resource Strain: Not on file   Food Insecurity: Not on file   Transportation Needs: Not on file   Physical Activity: Not on file   Stress: Not on file   Social Connections: Not on file   Intimate Partner Violence: Not on file   Housing Stability: Not on file        Review of Systems   Constitutional: Negative. HENT: Negative. Eyes: Negative. Respiratory: Negative. Negative for chest tightness. Cardiovascular: Negative. Negative for chest pain, palpitations and leg swelling. Gastrointestinal: Negative. Endocrine: Negative. Genitourinary: Negative. Musculoskeletal:  Negative for muscle weakness. Skin: Negative. Neurological: Negative. Negative for dizziness. Psychiatric/Behavioral: Negative. /72   Pulse 56   Temp 97.3 °F (36.3 °C) (Temporal)   Ht 5' 10\" (1.778 m)   Wt 215 lb (97.5 kg)   SpO2 97%   BMI 30.85 kg/m²      Physical Exam  Constitutional:       General: He is not in acute distress. Appearance: Normal appearance. HENT:      Head: Normocephalic. Nose: Nose normal.   Eyes:      Conjunctiva/sclera: Conjunctivae normal.   Cardiovascular:      Rate and Rhythm: Normal rate. Pulmonary:      Effort: Pulmonary effort is normal.      Breath sounds: Normal breath sounds. Abdominal:      General: Abdomen is flat. Bowel sounds are normal.      Palpations: Abdomen is soft. Musculoskeletal:         General: Normal range of motion. Cervical back: Normal range of motion. Skin:     General: Skin is warm and dry. Neurological:      General: No focal deficit present. Mental Status: He is alert.    Psychiatric:         Mood and Affect: Mood normal.       Nurse Only on 10/05/2022   Component Date Value Ref Range Status    TSH, 3RD GENERATION 10/05/2022 4.270 (A)  0.358 - 3.740 uIU/mL Final    PSA 10/05/2022 1.8  <4.0 ng/mL Final    Comment: [employer].   New method in use, please reestablish patient baseline      Color, UA 10/05/2022 YELLOW/STRAW    Final    Color Reference Range: Straw, Yellow or Dark Yellow    Appearance 10/05/2022 CLEAR    Final    Specific Gravity, UA 10/05/2022 1.010  1.001 - 1.023   Final    pH, Urine 10/05/2022 6.0  5.0 - 9.0   Final    Protein, UA 10/05/2022 Negative  Negative mg/dL Final    Glucose, UA 10/05/2022 Negative  mg/dL Final    Ketones, Urine 10/05/2022 Negative  Negative mg/dL Final    Bilirubin Urine 10/05/2022 Negative  Negative   Final    Blood, Urine 10/05/2022 Negative  Negative   Final    Urobilinogen, Urine 10/05/2022 0.2  0.2 - 1.0 EU/dL Final    Nitrite, Urine 10/05/2022 Negative  Negative   Final    Leukocyte Esterase, Urine 10/05/2022 Negative  Negative   Final    Urine Culture if Indicated 10/05/2022 CULTURE NOT INDICATED BY UA RESULT    Final    WBC, UA 10/05/2022 0-4 (A)  NORMAL /hpf Final    RBC, UA 10/05/2022 0-5 (A)  NORMAL /hpf Final    BACTERIA, URINE 10/05/2022 Negative (A)  NORMAL /hpf Final    Epithelial Cells UA 10/05/2022 0-5 (A)  NORMAL /hpf Final    Casts 10/05/2022 0-2 (A)  NORMAL /lpf Final    Mucus, UA 10/05/2022 0  0 /lpf Final    Cholesterol, Total 10/05/2022 152  <200 MG/DL Final    Comment: Borderline High: 200-239 mg/dL  High: Greater than or equal to 240 mg/dL      Triglycerides 10/05/2022 112  35 - 150 MG/DL Final    Comment: Borderline High: 150-199 mg/dL, High: 200-499 mg/dL  Very High: Greater than or equal to 500 mg/dL      HDL 10/05/2022 42  40 - 60 MG/DL Final    LDL Calculated 10/05/2022 87.6  <100 MG/DL Final    Comment: Near Optimal: 100-129 mg/dL  Borderline High: 130-159, High: 160-189 mg/dL  Very High: Greater than or equal to 190 mg/dL      VLDL Cholesterol Calculated 10/05/2022 22.4  6.0 - 23.0 MG/DL Final    Chol/HDL Ratio 10/05/2022 3.6    Final    Sodium 10/05/2022 137  136 - 145 mmol/L Final    Potassium 10/05/2022 4.2  3.5 - 5.1 mmol/L Final    Chloride 10/05/2022 104  101 - 110 mmol/L Final    CO2 10/05/2022 28  21 - 32 mmol/L Final    Anion Gap 10/05/2022 5  4 - 13 mmol/L Final    Glucose 10/05/2022 91  65 - 100 mg/dL Final    BUN 10/05/2022 18  8 - 23 MG/DL Final    Creatinine 10/05/2022 1.20  0.8 - 1.5 MG/DL Final    Est, Glom Filt Rate 10/05/2022 >60  >60 ml/min/1.73m2 Final    Comment:   Pediatric calculator link: [email or web address] org/professionals/kdoqi/gfr_calculatorped    Effective Oct 3, 2022    These results are not intended for use in patients <25years of age. eGFR results are calculated without a race factor using  the 2021 CKD-EPI equation. Careful clinical correlation is recommended, particularly when comparing to results calculated using previous equations. The CKD-EPI equation is less accurate in patients with extremes of muscle mass, extra-renal metabolism of creatinine, excessive creatine ingestion, or following therapy that affects renal tubular secretion.       Calcium 10/05/2022 8.9  8.3 - 10.4 MG/DL Final    Total Bilirubin 10/05/2022 0.5  0.2 - 1.1 MG/DL Final    ALT 10/05/2022 36  12 - 65 U/L Final    AST 10/05/2022 20  15 - 37 U/L Final    Alk Phosphatase 10/05/2022 83  50 - 136 U/L Final    Total Protein 10/05/2022 6.8  6.3 - 8.2 g/dL Final    Albumin 10/05/2022 3.9  3.2 - 4.6 g/dL Final    Globulin 10/05/2022 2.9  2.3 - 3.5 g/dL Final    Albumin/Globulin Ratio 10/05/2022 1.3  1.2 - 3.5   Final    WBC 10/05/2022 5.4  4.3 - 11.1 K/uL Final    RBC 10/05/2022 4.64  4.23 - 5.6 M/uL Final    Hemoglobin 10/05/2022 14.7  13.6 - 17.2 g/dL Final    Hematocrit 10/05/2022 44.9  41.1 - 50.3 % Final    MCV 10/05/2022 96.8  79.6 - 97.8 FL Final    MCH 10/05/2022 31.7  26.1 - 32.9 PG Final    MCHC 10/05/2022 32.7  31.4 - 35.0 g/dL Final    RDW 10/05/2022 12.0  11.9 - 14.6 % Final    Platelets 33/02/2332 188  150 - 450 K/uL Final    MPV 10/05/2022 11.6  9.4 - 12.3 FL Final    nRBC 10/05/2022 0.00  0.0 - 0.2 K/uL Final    **Note: Absolute NRBC parameter is now reported with Hemogram**    Differential Type 10/05/2022 AUTOMATED    Final    Seg Neutrophils 10/05/2022 60  43 - 78 % Final    Lymphocytes 10/05/2022 25  13 - 44 % Final    Monocytes 10/05/2022 10  4.0 - 12.0 % Final    Eosinophils % 10/05/2022 4  0.5 - 7.8 % Final    Basophils 10/05/2022 1  0.0 - 2.0 % Final    Immature Granulocytes 10/05/2022 0  0.0 - 5.0 % Final    Segs Absolute 10/05/2022 3.2  1.7 - 8.2 K/UL Final    Absolute Lymph # 10/05/2022 1.4  0.5 - 4.6 K/UL Final    Absolute Mono # 10/05/2022 0.5  0.1 - 1.3 K/UL Final    Absolute Eos # 10/05/2022 0.2  0.0 - 0.8 K/UL Final    Basophils Absolute 10/05/2022 0.0  0.0 - 0.2 K/UL Final    Absolute Immature Granulocyte 10/05/2022 0.0  0.0 - 0.5 K/UL Final       ASSESSMENT and PLAN    Flu vaccine need  -     Influenza, FLUCELVAX, (age 10 mo+), IM, Preservative Free, 0.5 mL  Annual physical exam  Arthralgia of shoulder, unspecified laterality  Coronary artery disease due to lipid rich plaque  H/O melanoma excision    The patient was seen today for the annual preventive health visit. The patient was provided anticipatory guidance and counseling regarding age / sex appropriate health maintenance issues including vaccinations, blood pressure screening, lipid screening, cancer screenings, diet, exercise, avoidance of tobacco / substance abuse. Current treatment plan is effective. no changes in therapy  lab results and schedule for future lab studies reviewed with patient  reviewed diet, exercise and weight control   Cardiovascular risk and specific lipid/ LDL goals reviewed    No follow-ups on file.      Alexa Telles MD

## 2023-10-15 SDOH — ECONOMIC STABILITY: INCOME INSECURITY: HOW HARD IS IT FOR YOU TO PAY FOR THE VERY BASICS LIKE FOOD, HOUSING, MEDICAL CARE, AND HEATING?: NOT HARD AT ALL

## 2023-10-15 SDOH — ECONOMIC STABILITY: HOUSING INSECURITY
IN THE LAST 12 MONTHS, WAS THERE A TIME WHEN YOU DID NOT HAVE A STEADY PLACE TO SLEEP OR SLEPT IN A SHELTER (INCLUDING NOW)?: NO

## 2023-10-15 SDOH — HEALTH STABILITY: PHYSICAL HEALTH: ON AVERAGE, HOW MANY MINUTES DO YOU ENGAGE IN EXERCISE AT THIS LEVEL?: 90 MIN

## 2023-10-15 SDOH — ECONOMIC STABILITY: TRANSPORTATION INSECURITY
IN THE PAST 12 MONTHS, HAS LACK OF TRANSPORTATION KEPT YOU FROM MEETINGS, WORK, OR FROM GETTING THINGS NEEDED FOR DAILY LIVING?: NO

## 2023-10-15 SDOH — ECONOMIC STABILITY: FOOD INSECURITY: WITHIN THE PAST 12 MONTHS, YOU WORRIED THAT YOUR FOOD WOULD RUN OUT BEFORE YOU GOT MONEY TO BUY MORE.: NEVER TRUE

## 2023-10-15 SDOH — HEALTH STABILITY: PHYSICAL HEALTH: ON AVERAGE, HOW MANY DAYS PER WEEK DO YOU ENGAGE IN MODERATE TO STRENUOUS EXERCISE (LIKE A BRISK WALK)?: 4 DAYS

## 2023-10-15 SDOH — ECONOMIC STABILITY: FOOD INSECURITY: WITHIN THE PAST 12 MONTHS, THE FOOD YOU BOUGHT JUST DIDN'T LAST AND YOU DIDN'T HAVE MONEY TO GET MORE.: NEVER TRUE

## 2023-10-15 ASSESSMENT — PATIENT HEALTH QUESTIONNAIRE - PHQ9
SUM OF ALL RESPONSES TO PHQ QUESTIONS 1-9: 0
SUM OF ALL RESPONSES TO PHQ QUESTIONS 1-9: 0
2. FEELING DOWN, DEPRESSED OR HOPELESS: 0
SUM OF ALL RESPONSES TO PHQ9 QUESTIONS 1 & 2: 0
SUM OF ALL RESPONSES TO PHQ9 QUESTIONS 1 & 2: 0
2. FEELING DOWN, DEPRESSED OR HOPELESS: NOT AT ALL
SUM OF ALL RESPONSES TO PHQ QUESTIONS 1-9: 0
1. LITTLE INTEREST OR PLEASURE IN DOING THINGS: NOT AT ALL
1. LITTLE INTEREST OR PLEASURE IN DOING THINGS: 0
SUM OF ALL RESPONSES TO PHQ QUESTIONS 1-9: 0

## 2023-10-15 ASSESSMENT — LIFESTYLE VARIABLES
HOW MANY STANDARD DRINKS CONTAINING ALCOHOL DO YOU HAVE ON A TYPICAL DAY: PATIENT DOES NOT DRINK
HOW OFTEN DO YOU HAVE SIX OR MORE DRINKS ON ONE OCCASION: 1
HOW OFTEN DO YOU HAVE A DRINK CONTAINING ALCOHOL: 1
HOW MANY STANDARD DRINKS CONTAINING ALCOHOL DO YOU HAVE ON A TYPICAL DAY: 0
HOW OFTEN DO YOU HAVE A DRINK CONTAINING ALCOHOL: NEVER

## 2023-10-16 ENCOUNTER — OFFICE VISIT (OUTPATIENT)
Dept: FAMILY MEDICINE CLINIC | Facility: CLINIC | Age: 63
End: 2023-10-16
Payer: MEDICARE

## 2023-10-16 ENCOUNTER — NURSE ONLY (OUTPATIENT)
Dept: FAMILY MEDICINE CLINIC | Facility: CLINIC | Age: 63
End: 2023-10-16

## 2023-10-16 VITALS
WEIGHT: 223 LBS | OXYGEN SATURATION: 97 % | TEMPERATURE: 97 F | HEART RATE: 70 BPM | HEIGHT: 70 IN | SYSTOLIC BLOOD PRESSURE: 122 MMHG | BODY MASS INDEX: 31.92 KG/M2 | DIASTOLIC BLOOD PRESSURE: 82 MMHG

## 2023-10-16 DIAGNOSIS — M25.551 BILATERAL HIP PAIN: ICD-10-CM

## 2023-10-16 DIAGNOSIS — Z13.228 SCREENING FOR METABOLIC DISORDER: ICD-10-CM

## 2023-10-16 DIAGNOSIS — Z13.0 SCREENING FOR IRON DEFICIENCY ANEMIA: ICD-10-CM

## 2023-10-16 DIAGNOSIS — Z13.89 SCREENING FOR BLOOD OR PROTEIN IN URINE: ICD-10-CM

## 2023-10-16 DIAGNOSIS — Z13.29 SCREENING FOR THYROID DISORDER: ICD-10-CM

## 2023-10-16 DIAGNOSIS — Z13.220 SCREENING FOR LIPID DISORDERS: ICD-10-CM

## 2023-10-16 DIAGNOSIS — Z12.5 SCREENING PSA (PROSTATE SPECIFIC ANTIGEN): ICD-10-CM

## 2023-10-16 DIAGNOSIS — Z00.00 WELCOME TO MEDICARE PREVENTIVE VISIT: ICD-10-CM

## 2023-10-16 DIAGNOSIS — Z23 FLU VACCINE NEED: Primary | ICD-10-CM

## 2023-10-16 DIAGNOSIS — Z00.00 MEDICARE ANNUAL WELLNESS VISIT, SUBSEQUENT: Primary | ICD-10-CM

## 2023-10-16 DIAGNOSIS — M25.552 BILATERAL HIP PAIN: ICD-10-CM

## 2023-10-16 DIAGNOSIS — Z13.220 SCREENING FOR HYPERCHOLESTEROLEMIA: ICD-10-CM

## 2023-10-16 DIAGNOSIS — Z00.00 MEDICARE ANNUAL WELLNESS VISIT, SUBSEQUENT: ICD-10-CM

## 2023-10-16 LAB
ALBUMIN SERPL-MCNC: 3.7 G/DL (ref 3.2–4.6)
ALBUMIN/GLOB SERPL: 1.2 (ref 0.4–1.6)
ALP SERPL-CCNC: 80 U/L (ref 50–136)
ALT SERPL-CCNC: 44 U/L (ref 12–65)
ANION GAP SERPL CALC-SCNC: 6 MMOL/L (ref 2–11)
AST SERPL-CCNC: 28 U/L (ref 15–37)
BASOPHILS # BLD: 0.1 K/UL (ref 0–0.2)
BASOPHILS NFR BLD: 1 % (ref 0–2)
BILIRUB SERPL-MCNC: 0.5 MG/DL (ref 0.2–1.1)
BUN SERPL-MCNC: 15 MG/DL (ref 8–23)
CALCIUM SERPL-MCNC: 8.6 MG/DL (ref 8.3–10.4)
CHLORIDE SERPL-SCNC: 110 MMOL/L (ref 101–110)
CHOLEST SERPL-MCNC: 151 MG/DL
CO2 SERPL-SCNC: 26 MMOL/L (ref 21–32)
CREAT SERPL-MCNC: 1.2 MG/DL (ref 0.8–1.5)
DIFFERENTIAL METHOD BLD: NORMAL
EOSINOPHIL # BLD: 0.3 K/UL (ref 0–0.8)
EOSINOPHIL NFR BLD: 5 % (ref 0.5–7.8)
ERYTHROCYTE [DISTWIDTH] IN BLOOD BY AUTOMATED COUNT: 12 % (ref 11.9–14.6)
GLOBULIN SER CALC-MCNC: 3 G/DL (ref 2.8–4.5)
GLUCOSE SERPL-MCNC: 101 MG/DL (ref 65–100)
HCT VFR BLD AUTO: 43.7 % (ref 41.1–50.3)
HDLC SERPL-MCNC: 42 MG/DL (ref 40–60)
HDLC SERPL: 3.6
HGB BLD-MCNC: 14.7 G/DL (ref 13.6–17.2)
IMM GRANULOCYTES # BLD AUTO: 0 K/UL (ref 0–0.5)
IMM GRANULOCYTES NFR BLD AUTO: 0 % (ref 0–5)
LDLC SERPL CALC-MCNC: 80.4 MG/DL
LYMPHOCYTES # BLD: 1.1 K/UL (ref 0.5–4.6)
LYMPHOCYTES NFR BLD: 17 % (ref 13–44)
MCH RBC QN AUTO: 32 PG (ref 26.1–32.9)
MCHC RBC AUTO-ENTMCNC: 33.6 G/DL (ref 31.4–35)
MCV RBC AUTO: 95 FL (ref 82–102)
MONOCYTES # BLD: 0.6 K/UL (ref 0.1–1.3)
MONOCYTES NFR BLD: 8 % (ref 4–12)
NEUTS SEG # BLD: 4.7 K/UL (ref 1.7–8.2)
NEUTS SEG NFR BLD: 69 % (ref 43–78)
NRBC # BLD: 0 K/UL (ref 0–0.2)
PLATELET # BLD AUTO: 188 K/UL (ref 150–450)
PMV BLD AUTO: 11.1 FL (ref 9.4–12.3)
POTASSIUM SERPL-SCNC: 4.1 MMOL/L (ref 3.5–5.1)
PROT SERPL-MCNC: 6.7 G/DL (ref 6.3–8.2)
PSA SERPL-MCNC: 2.5 NG/ML
RBC # BLD AUTO: 4.6 M/UL (ref 4.23–5.6)
SODIUM SERPL-SCNC: 142 MMOL/L (ref 133–143)
TRIGL SERPL-MCNC: 143 MG/DL (ref 35–150)
TSH, 3RD GENERATION: 3.19 UIU/ML (ref 0.36–3.74)
VLDLC SERPL CALC-MCNC: 28.6 MG/DL (ref 6–23)
WBC # BLD AUTO: 6.8 K/UL (ref 4.3–11.1)

## 2023-10-16 PROCEDURE — 1123F ACP DISCUSS/DSCN MKR DOCD: CPT | Performed by: FAMILY MEDICINE

## 2023-10-16 PROCEDURE — G0008 ADMIN INFLUENZA VIRUS VAC: HCPCS | Performed by: FAMILY MEDICINE

## 2023-10-16 PROCEDURE — G0403 EKG FOR INITIAL PREVENT EXAM: HCPCS | Performed by: FAMILY MEDICINE

## 2023-10-16 PROCEDURE — 3017F COLORECTAL CA SCREEN DOC REV: CPT | Performed by: FAMILY MEDICINE

## 2023-10-16 PROCEDURE — 90694 VACC AIIV4 NO PRSRV 0.5ML IM: CPT | Performed by: FAMILY MEDICINE

## 2023-10-16 PROCEDURE — G0402 INITIAL PREVENTIVE EXAM: HCPCS | Performed by: FAMILY MEDICINE

## 2023-10-16 PROCEDURE — 96372 THER/PROPH/DIAG INJ SC/IM: CPT | Performed by: FAMILY MEDICINE

## 2023-10-16 RX ORDER — METHYLPREDNISOLONE SODIUM SUCCINATE 40 MG/ML
40 INJECTION, POWDER, LYOPHILIZED, FOR SOLUTION INTRAMUSCULAR; INTRAVENOUS ONCE
Status: COMPLETED | OUTPATIENT
Start: 2023-10-16 | End: 2023-10-16

## 2023-10-16 RX ADMIN — METHYLPREDNISOLONE SODIUM SUCCINATE 40 MG: 40 INJECTION, POWDER, LYOPHILIZED, FOR SOLUTION INTRAMUSCULAR; INTRAVENOUS at 15:41

## 2023-10-16 ASSESSMENT — VISUAL ACUITY
OD_CC: 20/20
OS_CC: 20/20

## 2023-10-17 LAB
APPEARANCE UR: ABNORMAL
BACTERIA URNS QL MICRO: NEGATIVE /HPF
BILIRUB UR QL: NEGATIVE
CASTS URNS QL MICRO: ABNORMAL /LPF (ref 0–2)
COLOR UR: ABNORMAL
EPI CELLS #/AREA URNS HPF: ABNORMAL /HPF (ref 0–5)
GLUCOSE UR STRIP.AUTO-MCNC: NEGATIVE MG/DL
HGB UR QL STRIP: NEGATIVE
KETONES UR QL STRIP.AUTO: NEGATIVE MG/DL
LEUKOCYTE ESTERASE UR QL STRIP.AUTO: NEGATIVE
MUCOUS THREADS URNS QL MICRO: 0 /LPF
NITRITE UR QL STRIP.AUTO: NEGATIVE
PH UR STRIP: 5 (ref 5–9)
PROT UR STRIP-MCNC: NEGATIVE MG/DL
RBC #/AREA URNS HPF: ABNORMAL /HPF (ref 0–5)
SP GR UR REFRACTOMETRY: 1.03 (ref 1–1.02)
URINE CULTURE IF INDICATED: ABNORMAL
UROBILINOGEN UR QL STRIP.AUTO: 0.2 EU/DL (ref 0.2–1)
WBC URNS QL MICRO: ABNORMAL /HPF (ref 0–4)

## 2023-10-24 NOTE — PATIENT INSTRUCTIONS
Advance Directives: Care Instructions  Overview  An advance directive is a legal way to state your wishes at the end of your life. It tells your family and your doctor what to do if you can't say what you want. There are two main types of advance directives. You can change them any time your wishes change. Living will. This form tells your family and your doctor your wishes about life support and other treatment. The form is also called a declaration. Medical power of . This form lets you name a person to make treatment decisions for you when you can't speak for yourself. This person is called a health care agent (health care proxy, health care surrogate). The form is also called a durable power of  for health care. If you do not have an advance directive, decisions about your medical care may be made by a family member, or by a doctor or a  who doesn't know you. It may help to think of an advance directive as a gift to the people who care for you. If you have one, they won't have to make tough decisions by themselves. For more information, including forms for your state, see the [address] [place] [address] website (www.caringinfo.org/planning/advance-directives/). Follow-up care is a key part of your treatment and safety. Be sure to make and go to all appointments, and call your doctor if you are having problems. It's also a good idea to know your test results and keep a list of the medicines you take. What should you include in an advance directive? Many states have a unique advance directive form. (It may ask you to address specific issues.) Or you might use a universal form that's approved by many states. If your form doesn't tell you what to address, it may be hard to know what to include in your advance directive. Use the questions below to help you get started. Who do you want to make decisions about your medical care if you are not able to?   What life-support measures do you want if you

## 2023-10-24 NOTE — PROGRESS NOTES
well- nourished, in no acute distress  Skin: warm and dry, no rash or erythema  Head: normocephalic and atraumatic  Eyes: pupils equal, round, and reactive to light, extraocular eye movements intact, conjunctivae normal  ENT: tympanic membrane, external ear and ear canal normal bilaterally, nose without deformity, nasal mucosa and turbinates normal without polyps  Neck: supple and non-tender without mass, no thyromegaly or thyroid nodules, no cervical lymphadenopathy  Pulmonary/Chest: clear to auscultation bilaterally- no wheezes, rales or rhonchi, normal air movement, no respiratory distress  Cardiovascular: normal rate, regular rhythm, normal S1 and S2, no murmurs, rubs, clicks, or gallops, distal pulses intact, no carotid bruits  Abdomen: soft, non-tender, non-distended, normal bowel sounds, no masses or organomegaly  Genitourinary/Rectal: genitals normal without hernia or inguinal adenopathy, rectal normal without masses, prostate normal in size without masses or nodules  Extremities: no cyanosis, clubbing or edema  Musculoskeletal: normal range of motion, no joint swelling, deformity or tenderness  Neurologic: reflexes normal and symmetric, no cranial nerve deficit, gait, coordination and speech normal       No Known Allergies  Prior to Visit Medications    Medication Sig Taking?  Authorizing Provider   Glucosamine Sulfate 1000 MG TABS Take by mouth Yes Automatic Reconciliation, Ar   ibuprofen (ADVIL;MOTRIN) 200 MG tablet Take by mouth Yes Automatic Reconciliation, Ar       CareTeam (Including outside providers/suppliers regularly involved in providing care):   Patient Care Team:  Negro Pickens MD as PCP - General  Negro Pickens MD as PCP - Empaneled Provider  Steph Craven as Physician     Reviewed and updated this visit:  Allergies  Meds  Med Hx  Surg Hx  Soc Hx  Fam Hx

## 2023-10-30 ENCOUNTER — TELEPHONE (OUTPATIENT)
Dept: FAMILY MEDICINE CLINIC | Facility: CLINIC | Age: 63
End: 2023-10-30

## 2023-10-30 NOTE — TELEPHONE ENCOUNTER
----- Message from Martina Shearer MA sent at 10/30/2023  2:27 PM EDT -----  PLEASE SCHEDULE APPT IN 4 WKS   ----- Message -----  From: Mitch Srivastava MD  Sent: 10/24/2023   6:57 PM EDT  To: Martina Shearer MA    Not bad but needs an appointment in the next 4 weeks to review scan .   ----- Message -----  From: Milan Certain: 10/23/2023  11:05 AM EDT  To: Mitch Srivastava MD

## 2023-11-07 PROBLEM — Z13.220 SCREENING FOR LIPID DISORDERS: Status: ACTIVE | Noted: 2023-11-07

## 2023-11-07 PROBLEM — Z13.0 SCREENING FOR IRON DEFICIENCY ANEMIA: Status: ACTIVE | Noted: 2023-11-07

## 2023-11-07 PROBLEM — Z13.29 SCREENING FOR THYROID DISORDER: Status: ACTIVE | Noted: 2023-11-07

## 2023-11-07 PROBLEM — Z13.89 SCREENING FOR BLOOD OR PROTEIN IN URINE: Status: ACTIVE | Noted: 2023-11-07

## 2023-11-07 PROBLEM — Z12.5 SCREENING PSA (PROSTATE SPECIFIC ANTIGEN): Status: ACTIVE | Noted: 2023-11-07

## 2023-11-07 PROBLEM — Z00.00 MEDICARE ANNUAL WELLNESS VISIT, SUBSEQUENT: Status: ACTIVE | Noted: 2023-11-07

## 2023-11-07 PROBLEM — Z13.228 SCREENING FOR METABOLIC DISORDER: Status: ACTIVE | Noted: 2023-11-07

## 2023-11-29 ENCOUNTER — OFFICE VISIT (OUTPATIENT)
Dept: FAMILY MEDICINE CLINIC | Facility: CLINIC | Age: 63
End: 2023-11-29
Payer: MEDICARE

## 2023-11-29 DIAGNOSIS — R97.20 ELEVATED PSA: Primary | ICD-10-CM

## 2023-11-29 DIAGNOSIS — R93.1 ELEVATED CORONARY ARTERY CALCIUM SCORE: ICD-10-CM

## 2023-11-29 PROCEDURE — G8484 FLU IMMUNIZE NO ADMIN: HCPCS | Performed by: FAMILY MEDICINE

## 2023-11-29 PROCEDURE — 99213 OFFICE O/P EST LOW 20 MIN: CPT | Performed by: FAMILY MEDICINE

## 2023-11-29 PROCEDURE — G8417 CALC BMI ABV UP PARAM F/U: HCPCS | Performed by: FAMILY MEDICINE

## 2023-11-29 PROCEDURE — G8427 DOCREV CUR MEDS BY ELIG CLIN: HCPCS | Performed by: FAMILY MEDICINE

## 2023-11-29 PROCEDURE — 1123F ACP DISCUSS/DSCN MKR DOCD: CPT | Performed by: FAMILY MEDICINE

## 2023-11-29 PROCEDURE — 3017F COLORECTAL CA SCREEN DOC REV: CPT | Performed by: FAMILY MEDICINE

## 2023-11-29 PROCEDURE — 4004F PT TOBACCO SCREEN RCVD TLK: CPT | Performed by: FAMILY MEDICINE

## 2023-11-30 VITALS
DIASTOLIC BLOOD PRESSURE: 76 MMHG | HEIGHT: 70 IN | OXYGEN SATURATION: 99 % | HEART RATE: 50 BPM | BODY MASS INDEX: 31.5 KG/M2 | TEMPERATURE: 98.4 F | WEIGHT: 220 LBS | SYSTOLIC BLOOD PRESSURE: 134 MMHG

## 2023-11-30 LAB — PSA SERPL-MCNC: 2.4 NG/ML

## 2023-11-30 RX ORDER — PRAVASTATIN SODIUM 20 MG
20 TABLET ORAL EVERY EVENING
Qty: 30 TABLET | Refills: 5 | Status: SHIPPED | OUTPATIENT
Start: 2023-11-30

## 2023-12-07 PROBLEM — Z13.0 SCREENING FOR IRON DEFICIENCY ANEMIA: Status: RESOLVED | Noted: 2023-11-07 | Resolved: 2023-12-07

## 2023-12-10 ASSESSMENT — ENCOUNTER SYMPTOMS
EYES NEGATIVE: 1
GASTROINTESTINAL NEGATIVE: 1
RESPIRATORY NEGATIVE: 1
CHEST TIGHTNESS: 0
SHORTNESS OF BREATH: 0

## 2024-01-09 ENCOUNTER — PROCEDURE VISIT (OUTPATIENT)
Dept: AUDIOLOGY | Age: 64
End: 2024-01-09

## 2024-01-09 DIAGNOSIS — Z97.4 USES HEARING AID: Primary | ICD-10-CM

## 2024-01-09 NOTE — PROGRESS NOTES
Patient reports not hearing well with his left hearing aid. Cleaned and checked both aids. Changed the dome and wax filter on both aids. A listening check revealed that both aids are functioning correctly at this time. The patient reports no other concerns at this time.   Steph Berger CCC-A

## 2024-02-29 ENCOUNTER — NURSE ONLY (OUTPATIENT)
Dept: FAMILY MEDICINE CLINIC | Facility: CLINIC | Age: 64
End: 2024-02-29

## 2024-02-29 ENCOUNTER — TELEPHONE (OUTPATIENT)
Dept: FAMILY MEDICINE CLINIC | Facility: CLINIC | Age: 64
End: 2024-02-29

## 2024-02-29 DIAGNOSIS — E78.5 ELEVATED LIPIDS: ICD-10-CM

## 2024-02-29 DIAGNOSIS — E78.5 ELEVATED LIPIDS: Primary | ICD-10-CM

## 2024-02-29 LAB
CHOLEST SERPL-MCNC: 142 MG/DL
HDLC SERPL-MCNC: 46 MG/DL (ref 40–60)
HDLC SERPL: 3.1
LDLC SERPL CALC-MCNC: 62.6 MG/DL
TRIGL SERPL-MCNC: 167 MG/DL (ref 35–150)
VLDLC SERPL CALC-MCNC: 33.4 MG/DL (ref 6–23)

## 2024-02-29 NOTE — TELEPHONE ENCOUNTER
Patient is having issues with his cholesterol medication and wants to know if there is anything different he can do. Please let him know.

## 2024-05-29 RX ORDER — PRAVASTATIN SODIUM 20 MG
20 TABLET ORAL EVERY EVENING
Qty: 30 TABLET | Refills: 5 | Status: SHIPPED | OUTPATIENT
Start: 2024-05-29

## 2024-10-21 SDOH — HEALTH STABILITY: PHYSICAL HEALTH: ON AVERAGE, HOW MANY MINUTES DO YOU ENGAGE IN EXERCISE AT THIS LEVEL?: 90 MIN

## 2024-10-21 SDOH — ECONOMIC STABILITY: FOOD INSECURITY: WITHIN THE PAST 12 MONTHS, THE FOOD YOU BOUGHT JUST DIDN'T LAST AND YOU DIDN'T HAVE MONEY TO GET MORE.: NEVER TRUE

## 2024-10-21 SDOH — ECONOMIC STABILITY: INCOME INSECURITY: HOW HARD IS IT FOR YOU TO PAY FOR THE VERY BASICS LIKE FOOD, HOUSING, MEDICAL CARE, AND HEATING?: NOT HARD AT ALL

## 2024-10-21 SDOH — ECONOMIC STABILITY: FOOD INSECURITY: WITHIN THE PAST 12 MONTHS, YOU WORRIED THAT YOUR FOOD WOULD RUN OUT BEFORE YOU GOT MONEY TO BUY MORE.: NEVER TRUE

## 2024-10-21 SDOH — HEALTH STABILITY: PHYSICAL HEALTH: ON AVERAGE, HOW MANY DAYS PER WEEK DO YOU ENGAGE IN MODERATE TO STRENUOUS EXERCISE (LIKE A BRISK WALK)?: 3 DAYS

## 2024-10-21 ASSESSMENT — LIFESTYLE VARIABLES
HOW OFTEN DO YOU HAVE A DRINK CONTAINING ALCOHOL: 1
HOW OFTEN DO YOU HAVE A DRINK CONTAINING ALCOHOL: NEVER
HOW OFTEN DO YOU HAVE SIX OR MORE DRINKS ON ONE OCCASION: 1
HOW MANY STANDARD DRINKS CONTAINING ALCOHOL DO YOU HAVE ON A TYPICAL DAY: 0
HOW MANY STANDARD DRINKS CONTAINING ALCOHOL DO YOU HAVE ON A TYPICAL DAY: PATIENT DOES NOT DRINK

## 2024-10-21 ASSESSMENT — PATIENT HEALTH QUESTIONNAIRE - PHQ9
SUM OF ALL RESPONSES TO PHQ QUESTIONS 1-9: 0
1. LITTLE INTEREST OR PLEASURE IN DOING THINGS: NOT AT ALL
SUM OF ALL RESPONSES TO PHQ9 QUESTIONS 1 & 2: 0
2. FEELING DOWN, DEPRESSED OR HOPELESS: NOT AT ALL

## 2024-10-23 ENCOUNTER — OFFICE VISIT (OUTPATIENT)
Dept: FAMILY MEDICINE CLINIC | Facility: CLINIC | Age: 64
End: 2024-10-23
Payer: MEDICARE

## 2024-10-23 VITALS
TEMPERATURE: 97.2 F | HEIGHT: 70 IN | WEIGHT: 220 LBS | BODY MASS INDEX: 31.5 KG/M2 | SYSTOLIC BLOOD PRESSURE: 138 MMHG | HEART RATE: 76 BPM | DIASTOLIC BLOOD PRESSURE: 80 MMHG | OXYGEN SATURATION: 98 %

## 2024-10-23 DIAGNOSIS — M79.604 RIGHT LEG PAIN: ICD-10-CM

## 2024-10-23 DIAGNOSIS — Z13.89 SCREENING FOR BLOOD OR PROTEIN IN URINE: ICD-10-CM

## 2024-10-23 DIAGNOSIS — R97.20 ELEVATED PSA: ICD-10-CM

## 2024-10-23 DIAGNOSIS — Z00.00 MEDICARE ANNUAL WELLNESS VISIT, SUBSEQUENT: ICD-10-CM

## 2024-10-23 DIAGNOSIS — Z23 FLU VACCINE NEED: ICD-10-CM

## 2024-10-23 DIAGNOSIS — E55.9 VITAMIN D DEFICIENCY: ICD-10-CM

## 2024-10-23 DIAGNOSIS — R79.89 LOW TESTOSTERONE: ICD-10-CM

## 2024-10-23 DIAGNOSIS — E78.5 ELEVATED LIPIDS: Primary | ICD-10-CM

## 2024-10-23 LAB
25(OH)D3 SERPL-MCNC: 19.7 NG/ML (ref 30–100)
ALBUMIN SERPL-MCNC: 3.7 G/DL (ref 3.2–4.6)
ALBUMIN/GLOB SERPL: 1.4 (ref 1–1.9)
ALP SERPL-CCNC: 79 U/L (ref 40–129)
ALT SERPL-CCNC: 42 U/L (ref 8–55)
ANION GAP SERPL CALC-SCNC: 11 MMOL/L (ref 9–18)
APPEARANCE UR: CLEAR
AST SERPL-CCNC: 33 U/L (ref 15–37)
BACTERIA URNS QL MICRO: NEGATIVE /HPF
BASOPHILS # BLD: 0 K/UL (ref 0–0.2)
BASOPHILS NFR BLD: 1 % (ref 0–2)
BILIRUB SERPL-MCNC: 0.5 MG/DL (ref 0–1.2)
BILIRUB UR QL: NEGATIVE
BUN SERPL-MCNC: 16 MG/DL (ref 8–23)
CALCIUM SERPL-MCNC: 8.7 MG/DL (ref 8.8–10.2)
CASTS URNS QL MICRO: 0 /LPF
CHLORIDE SERPL-SCNC: 103 MMOL/L (ref 98–107)
CHOLEST SERPL-MCNC: 108 MG/DL (ref 0–200)
CO2 SERPL-SCNC: 23 MMOL/L (ref 20–28)
COLOR UR: NORMAL
CREAT SERPL-MCNC: 1.04 MG/DL (ref 0.8–1.3)
CRYSTALS URNS QL MICRO: 0 /LPF
DIFFERENTIAL METHOD BLD: ABNORMAL
EOSINOPHIL # BLD: 0.1 K/UL (ref 0–0.8)
EOSINOPHIL NFR BLD: 4 % (ref 0.5–7.8)
EPI CELLS #/AREA URNS HPF: NORMAL /HPF (ref 0–5)
ERYTHROCYTE [DISTWIDTH] IN BLOOD BY AUTOMATED COUNT: 12 % (ref 11.9–14.6)
GLOBULIN SER CALC-MCNC: 2.7 G/DL (ref 2.3–3.5)
GLUCOSE SERPL-MCNC: 100 MG/DL (ref 70–99)
GLUCOSE UR STRIP.AUTO-MCNC: NEGATIVE MG/DL
HCT VFR BLD AUTO: 41 % (ref 41.1–50.3)
HDLC SERPL-MCNC: 39 MG/DL (ref 40–60)
HDLC SERPL: 2.8 (ref 0–5)
HGB BLD-MCNC: 14.3 G/DL (ref 13.6–17.2)
HGB UR QL STRIP: NEGATIVE
HYALINE CASTS URNS QL MICRO: NORMAL /LPF
IMM GRANULOCYTES # BLD AUTO: 0 K/UL (ref 0–0.5)
IMM GRANULOCYTES NFR BLD AUTO: 0 % (ref 0–5)
KETONES UR QL STRIP.AUTO: NEGATIVE MG/DL
LDLC SERPL CALC-MCNC: 51 MG/DL (ref 0–100)
LEUKOCYTE ESTERASE UR QL STRIP.AUTO: NEGATIVE
LYMPHOCYTES # BLD: 0.5 K/UL (ref 0.5–4.6)
LYMPHOCYTES NFR BLD: 18 % (ref 13–44)
MCH RBC QN AUTO: 32.9 PG (ref 26.1–32.9)
MCHC RBC AUTO-ENTMCNC: 34.9 G/DL (ref 31.4–35)
MCV RBC AUTO: 94.5 FL (ref 82–102)
MONOCYTES # BLD: 0.4 K/UL (ref 0.1–1.3)
MONOCYTES NFR BLD: 14 % (ref 4–12)
MUCOUS THREADS URNS QL MICRO: 0 /LPF
NEUTS SEG # BLD: 1.8 K/UL (ref 1.7–8.2)
NEUTS SEG NFR BLD: 63 % (ref 43–78)
NITRITE UR QL STRIP.AUTO: NEGATIVE
NRBC # BLD: 0 K/UL (ref 0–0.2)
PH UR STRIP: 5.5 (ref 5–9)
PLATELET # BLD AUTO: 145 K/UL (ref 150–450)
PMV BLD AUTO: 10.8 FL (ref 9.4–12.3)
POTASSIUM SERPL-SCNC: 4.1 MMOL/L (ref 3.5–5.1)
PROT SERPL-MCNC: 6.3 G/DL (ref 6.3–8.2)
PROT UR STRIP-MCNC: NEGATIVE MG/DL
PSA SERPL-MCNC: 1.7 NG/ML (ref 0–4)
RBC # BLD AUTO: 4.34 M/UL (ref 4.23–5.6)
RBC #/AREA URNS HPF: NORMAL /HPF (ref 0–5)
SODIUM SERPL-SCNC: 138 MMOL/L (ref 136–145)
SP GR UR REFRACTOMETRY: 1.02 (ref 1–1.02)
TRIGL SERPL-MCNC: 92 MG/DL (ref 0–150)
TSH, 3RD GENERATION: 4.09 UIU/ML (ref 0.27–4.2)
URINE CULTURE IF INDICATED: NORMAL
UROBILINOGEN UR QL STRIP.AUTO: 0.2 EU/DL (ref 0.2–1)
VLDLC SERPL CALC-MCNC: 18 MG/DL (ref 6–23)
WBC # BLD AUTO: 2.8 K/UL (ref 4.3–11.1)
WBC URNS QL MICRO: NORMAL /HPF (ref 0–4)

## 2024-10-23 PROCEDURE — G0008 ADMIN INFLUENZA VIRUS VAC: HCPCS | Performed by: NURSE PRACTITIONER

## 2024-10-23 PROCEDURE — 90653 IIV ADJUVANT VACCINE IM: CPT | Performed by: NURSE PRACTITIONER

## 2024-10-24 NOTE — RESULT ENCOUNTER NOTE
GM , so labs all look good but the vit D level is very low and this can cause some of the symptoms we were talking about , so start over the counter vit D , 2000  I U per day .

## 2024-10-26 LAB
TESTOST FREE SERPL-MCNC: 5.2 PG/ML (ref 6.6–18.1)
TESTOST SERPL-MCNC: 290 NG/DL (ref 264–916)

## 2024-11-04 ENCOUNTER — TELEPHONE (OUTPATIENT)
Dept: FAMILY MEDICINE CLINIC | Facility: CLINIC | Age: 64
End: 2024-11-04

## 2024-11-04 NOTE — TELEPHONE ENCOUNTER
Pt's wife called and said she was told by Janine that Dr Murguia will take her on but she was wondering about her ? She wants to know will Dr JEFFERS take her  as well?

## 2024-11-19 ENCOUNTER — HOSPITAL ENCOUNTER (OUTPATIENT)
Dept: PHYSICAL THERAPY | Age: 64
Setting detail: RECURRING SERIES
Discharge: HOME | End: 2024-11-22
Attending: FAMILY MEDICINE
Payer: MEDICARE

## 2024-11-19 DIAGNOSIS — M25.551 PAIN IN RIGHT HIP: Primary | ICD-10-CM

## 2024-11-19 DIAGNOSIS — M79.651 PAIN IN RIGHT THIGH: ICD-10-CM

## 2024-11-19 PROCEDURE — 97110 THERAPEUTIC EXERCISES: CPT

## 2024-11-19 PROCEDURE — 97162 PT EVAL MOD COMPLEX 30 MIN: CPT

## 2024-11-19 ASSESSMENT — PAIN SCALES - GENERAL: PAINLEVEL_OUTOF10: 3

## 2024-11-19 NOTE — PROGRESS NOTES
Oneil Tolentino  : 1960  Primary: Medicare Part A And B (Medicare)  Secondary: [employer] HE[employer] MEDICARE SUPP [facility] @ [place] [address] [place] [address]  Phone: [phone]  Fax: [phone] Plan Frequency: 2x/wk for 90 days    Plan of Care/Certification Expiration Date: 25        Plan of Care/Certification Expiration Date:  Plan of Care/Certification Expiration Date: 25    Frequency/Duration:   Plan Frequency: 2x/wk for 90 days      Time In/Out:   Time In: 0730  Time Out: 0814      PT Visit Info:         Visit Count:  1    OUTPATIENT PHYSICAL THERAPY:   Treatment Note 2024       Episode  (R hip pain )               Treatment Diagnosis:    Pain in right hip  Pain in right thigh  Medical/Referring Diagnosis:    Right leg pain    Referring Physician:  Radha Murguia MD MD Orders:  PT Eval and Treat   Return MD Appt:  N/A   Date of Onset:  Onset Date: 24     Allergies:   Patient has no known allergies.  Restrictions/Precautions:   None      Interventions Planned (Treatment may consist of any combination of the following):     See Assessment Note    Subjective Comments:   See eval  Initial Pain Level::     3/10  Post Session Pain Level:       3/10  Medications Last Reviewed:  2024  Updated Objective Findings:  See Evaluation Note from today  Treatment   THERAPEUTIC EXERCISE: (10 minutes):    Exercises per grid below to improve mobility and strength.  Required moderate visual, verbal, manual, and tactile cues to promote proper body alignment, promote proper body posture, promote proper body mechanics, and promote proper body breathing techniques.  Progressed resistance, range, repetitions, and complexity of movement as indicated.   Date:  24   Date:   Date:     Activity/Exercise Parameters Parameters Parameters   Prone quad stretch 3x30\"     Pal stretch 2 min     JEFF stretch 1 min     Prone on elbows 2 min     Pt

## 2024-11-19 NOTE — THERAPY EVALUATION
Oneil Tolentino  : 1960  Primary: Medicare Part A And B (Medicare)  Secondary: [employer] HE[employer] C[employer] MEDICARE SUPP [facility] [place] [address]  Phone: [phone]  Fax: [phone] Plan Frequency: 2x/wk for 90 days  Plan of Care/Certification Expiration Date: 25        Plan of Care/Certification Expiration Date:  Plan of Care/Certification Expiration Date: 25    Frequency/Duration: Plan Frequency: 2x/wk for 90 days      Time In/Out:   Time In: 0730  Time Out: 0814      PT Visit Info:         Visit Count:  1                OUTPATIENT PHYSICAL THERAPY:             Initial Assessment 2024               Episode (R hip pain )         Treatment Diagnosis:     Pain in right hip  Pain in right thigh  Medical/Referring Diagnosis:    Right leg pain    Referring Physician:  Radha Murguia MD MD Orders:  PT Eval and Treat   Return MD Appt:  N/A  Date of Onset:  Onset Date: 24     Allergies:  Patient has no known allergies.  Restrictions/Precautions:    None      Medications Last Reviewed:  2024     SUBJECTIVE   History of Injury/Illness (Reason for Referral):  Pt reports hx of R hip pain that started in the buttock area and radiated down his lateral hip and thigh. His MD thought bursitis and gave him an injection which only helped a few days. He has a massage gun which has helped it. This pain is worse at night if he sleeps on his R side. The past 2-3 months this pain has improved some but now he has increased R anterior hip and quad pain with occasional \"catching\" that is worse with stairs, and walking on uneven terrain. Pain is described as an ache or sharp and he denies burning, numbness or tingling. He denies LBP or hx of sciatic pain. He is currently unable to do as much weight at the gym or walk as long on the treadmill. He has a bad R knee with Osgood Schlotters diagnosed in childhood which makes him unable to

## 2024-11-22 ENCOUNTER — HOSPITAL ENCOUNTER (OUTPATIENT)
Dept: PHYSICAL THERAPY | Age: 64
Setting detail: RECURRING SERIES
Discharge: HOME | End: 2024-11-25
Attending: FAMILY MEDICINE
Payer: MEDICARE

## 2024-11-22 PROCEDURE — 97140 MANUAL THERAPY 1/> REGIONS: CPT

## 2024-11-22 PROCEDURE — 97110 THERAPEUTIC EXERCISES: CPT

## 2024-11-22 ASSESSMENT — PAIN SCALES - GENERAL: PAINLEVEL_OUTOF10: 2

## 2024-11-22 NOTE — PROGRESS NOTES
Oneil Tolentino  : 1960  Primary: Medicare Part A And B (Medicare)  Secondary: [employer] HE[employer] C[employer] MEDICARE SUPP [facility] @ [place] [address] [place] [address]  Phone: [phone]  Fax: [phone] Plan Frequency: 2x/wk for 90 days    Plan of Care/Certification Expiration Date: 25        Plan of Care/Certification Expiration Date:  Plan of Care/Certification Expiration Date: 25    Frequency/Duration:   Plan Frequency: 2x/wk for 90 days      Time In/Out:   Time In: 0815  Time Out: 0859      PT Visit Info:         Visit Count:  2    OUTPATIENT PHYSICAL THERAPY:   Treatment Note 2024       Episode  (R hip pain )               Treatment Diagnosis:    Pain in right hip  Pain in right thigh  Medical/Referring Diagnosis:    Right leg pain    Referring Physician:  Radha Murguia MD MD Orders:  PT Eval and Treat   Return MD Appt:  N/A   Date of Onset:  Onset Date: 24     Allergies:   Patient has no known allergies.  Restrictions/Precautions:   None      Interventions Planned (Treatment may consist of any combination of the following):     See Assessment Note    Subjective Comments: Pt reports he is already noticing improvement in pain and ability to perform stairs and walk.     Initial Pain Level::     210  Post Session Pain Level:       /10  Medications Last Reviewed:  2024  Updated Objective Findings:  None Today  Treatment   THERAPEUTIC EXERCISE: (30 minutes):    Exercises per grid below to improve mobility and strength.  Required moderate visual, verbal, manual, and tactile cues to promote proper body alignment, promote proper body posture, promote proper body mechanics, and promote proper body breathing techniques.  Progressed resistance, range, repetitions, and complexity of movement as indicated.   Date:  24     Activity/Exercise Parameters   Prone quad stretch 3x30\" lon [place] stretch 2 min lon AGUILAR stretch    Prone on

## 2024-11-25 ENCOUNTER — HOSPITAL ENCOUNTER (OUTPATIENT)
Dept: PHYSICAL THERAPY | Age: 64
Setting detail: RECURRING SERIES
Discharge: HOME | End: 2024-11-28
Attending: FAMILY MEDICINE
Payer: MEDICARE

## 2024-11-25 PROCEDURE — 97110 THERAPEUTIC EXERCISES: CPT

## 2024-11-25 PROCEDURE — 97140 MANUAL THERAPY 1/> REGIONS: CPT

## 2024-11-25 ASSESSMENT — PAIN SCALES - GENERAL: PAINLEVEL_OUTOF10: 2

## 2024-11-25 NOTE — PROGRESS NOTES
Oneil BUSCH Randa  : 1960  Primary: Medicare Part A And B (Medicare)  Secondary: [employer] HE[employer] C[employer] MEDICARE SUPP [facility] @ [place] [address] [place] [address]  Phone: [phone]  Fax: [phone] Plan Frequency: 2x/wk for 90 days    Plan of Care/Certification Expiration Date: 25        Plan of Care/Certification Expiration Date:  Plan of Care/Certification Expiration Date: 25    Frequency/Duration:   Plan Frequency: 2x/wk for 90 days      Time In/Out:   Time In: 0730  Time Out: 0830      PT Visit Info:         Visit Count:  3    OUTPATIENT PHYSICAL THERAPY:   Treatment Note 2024       Episode  (R hip pain )               Treatment Diagnosis:    Pain in right hip  Pain in right thigh  Medical/Referring Diagnosis:    Right leg pain    Referring Physician:  Radha Murguia MD MD Orders:  PT Eval and Treat   Return MD Appt:  N/A   Date of Onset:  Onset Date: 24     Allergies:   Patient has no known allergies.  Restrictions/Precautions:   None      Interventions Planned (Treatment may consist of any combination of the following):     See Assessment Note    Subjective Comments: Pt notes initial treatment is helping.  Pt. Notes some increased soreness yesterday after hunting in the woods all day Saturday.      Initial Pain Level::     2/10  Post Session Pain Level:       1/10  Medications Last Reviewed:  2024  Updated Objective Findings:   FADIR (+) R side  Treatment   THERAPEUTIC EXERCISE: (40 minutes):    Exercises per grid below to improve mobility and strength.  Required moderate visual, verbal, manual, and tactile cues to promote proper body alignment, promote proper body posture, promote proper body mechanics, and promote proper body breathing techniques.  Progressed resistance, range, repetitions, and complexity of movement as indicated.   Date:  24     Activity/Exercise Parameters   Prone quad stretch 4 minutes   Pal

## 2024-11-27 ENCOUNTER — HOSPITAL ENCOUNTER (OUTPATIENT)
Dept: PHYSICAL THERAPY | Age: 64
Setting detail: RECURRING SERIES
Discharge: HOME | End: 2024-11-30
Attending: FAMILY MEDICINE
Payer: MEDICARE

## 2024-11-27 PROCEDURE — 97140 MANUAL THERAPY 1/> REGIONS: CPT

## 2024-11-27 PROCEDURE — 97110 THERAPEUTIC EXERCISES: CPT

## 2024-11-27 ASSESSMENT — PAIN SCALES - GENERAL: PAINLEVEL_OUTOF10: 2

## 2024-11-27 NOTE — PROGRESS NOTES
Oneil Tolentino  : 1960  Primary: Medicare Part A And B (Medicare)  Secondary: [employer] HE[employer] C[employer] MEDICARE SUPP [facility] @ [place] [address] [address] [place] [address]  Phone: [phone]  Fax: [phone] Plan Frequency: 2x/wk for 90 days    Plan of Care/Certification Expiration Date: 25        Plan of Care/Certification Expiration Date:  Plan of Care/Certification Expiration Date: 25    Frequency/Duration:   Plan Frequency: 2x/wk for 90 days      Time In/Out:   Time In: 0830  Time Out: 930      PT Visit Info:         Visit Count:  4    OUTPATIENT PHYSICAL THERAPY:   Treatment Note 2024       Episode  (R hip pain )               Treatment Diagnosis:    Pain in right hip  Pain in right thigh  Medical/Referring Diagnosis:    Right leg pain    Referring Physician:  Radha Murguia MD MD Orders:  PT Eval and Treat   Return MD Appt:  N/A   Date of Onset:  Onset Date: 24     Allergies:   Patient has no known allergies.  Restrictions/Precautions:   None      Interventions Planned (Treatment may consist of any combination of the following):     See Assessment Note    Subjective Comments: Pt reports soreness in the R anterior hip and lateral hip after last session but he felt pretty good yesterday.      Initial Pain Level::     2/10  Post Session Pain Level:       2/10  Medications Last Reviewed:  2024  Updated Objective Findings:  None Today  Treatment   THERAPEUTIC EXERCISE: (40 minutes):    Exercises per grid below to improve mobility and strength.  Required moderate visual, verbal, manual, and tactile cues to promote proper body alignment, promote proper body posture, promote proper body mechanics, and promote proper body breathing techniques.  Progressed resistance, range, repetitions, and complexity of movement as indicated.   Date:  24     Activity/Exercise Parameters   Prone quad stretch 4 minutes   Pal stretch 5 minutes   JEFF

## 2024-12-03 ENCOUNTER — HOSPITAL ENCOUNTER (OUTPATIENT)
Dept: PHYSICAL THERAPY | Age: 64
Setting detail: RECURRING SERIES
Discharge: HOME | End: 2024-12-06
Attending: FAMILY MEDICINE
Payer: MEDICARE

## 2024-12-03 PROCEDURE — 97140 MANUAL THERAPY 1/> REGIONS: CPT

## 2024-12-03 PROCEDURE — 97110 THERAPEUTIC EXERCISES: CPT

## 2024-12-03 ASSESSMENT — PAIN SCALES - GENERAL: PAINLEVEL_OUTOF10: 3

## 2024-12-03 NOTE — PROGRESS NOTES
Oneil BUSCH Randa  : 1960  Primary: Medicare Part A And B (Medicare)  Secondary: [employer] HE[employer] C[employer] MEDICARE SUPP [facility] @ [place]  [place] [address]  Phone: [phone]  Fax: [phone] Plan Frequency: 2x/wk for 90 days    Plan of Care/Certification Expiration Date: 25        Plan of Care/Certification Expiration Date:  Plan of Care/Certification Expiration Date: 25    Frequency/Duration:   Plan Frequency: 2x/wk for 90 days      Time In/Out:   Time In: 0830  Time Out: 0930      PT Visit Info:    Progress Note Counter: 5      Visit Count:  5    OUTPATIENT PHYSICAL THERAPY:   Treatment Note 12/3/2024       Episode  (R hip pain )               Treatment Diagnosis:    Pain in right hip  Pain in right thigh  Medical/Referring Diagnosis:    Right leg pain    Referring Physician:  Radha Murguia MD MD Orders:  PT Eval and Treat   Return MD Appt:  N/A   Date of Onset:  Onset Date: 24     Allergies:   Patient has no known allergies.  Restrictions/Precautions:   None      Interventions Planned (Treatment may consist of any combination of the following):     See Assessment Note    Subjective Comments: Pt reports that he put up the whole ridge-line of nick lights on Friday and was in a lot of pain on Saturday.  Is feeling better today.    Initial Pain Level:     3/10  Post Session Pain Level:       2/10  Medications Last Reviewed:  12/3/2024  Updated Objective Findings:  None Today  Treatment   THERAPEUTIC EXERCISE: (40 minutes):    Exercises per grid below to improve mobility and strength.  Required moderate visual, verbal, manual, and tactile cues to promote proper body alignment, promote proper body posture, promote proper body mechanics, and promote proper body breathing techniques.  Progressed resistance, range, repetitions, and complexity of movement as indicated.   Date:  24     Activity/Exercise Parameters   Prone quad

## 2024-12-05 ENCOUNTER — APPOINTMENT (OUTPATIENT)
Dept: PHYSICAL THERAPY | Age: 64
End: 2024-12-05
Attending: FAMILY MEDICINE
Payer: MEDICARE

## 2024-12-06 ENCOUNTER — HOSPITAL ENCOUNTER (OUTPATIENT)
Dept: PHYSICAL THERAPY | Age: 64
Setting detail: RECURRING SERIES
Discharge: HOME | End: 2024-12-09
Attending: FAMILY MEDICINE
Payer: MEDICARE

## 2024-12-06 PROCEDURE — 97110 THERAPEUTIC EXERCISES: CPT

## 2024-12-06 PROCEDURE — 97140 MANUAL THERAPY 1/> REGIONS: CPT

## 2024-12-06 ASSESSMENT — PAIN SCALES - GENERAL: PAINLEVEL_OUTOF10: 2

## 2024-12-06 NOTE — PROGRESS NOTES
Oneil BUSCH Randa  : 1960  Primary: Medicare Part A And B (Medicare)  Secondary: [employer] HE[employer] C[employer] MEDICARE SUPP [facility] @ [place] [address] [place] [address]  Phone: [phone]  Fax: [phone] Plan Frequency: 2x/wk for 90 days    Plan of Care/Certification Expiration Date: 25        Plan of Care/Certification Expiration Date:  Plan of Care/Certification Expiration Date: 25    Frequency/Duration:   Plan Frequency: 2x/wk for 90 days      Time In/Out:   Time In: 0730  Time Out: 0815      PT Visit Info:    Progress Note Counter: 6      Visit Count:  6    OUTPATIENT PHYSICAL THERAPY:   Treatment Note 2024       Episode  (R hip pain )               Treatment Diagnosis:    Pain in right hip  Pain in right thigh  Medical/Referring Diagnosis:    Right leg pain    Referring Physician:  Radha Murguia MD MD Orders:  PT Eval and Treat   Return MD Appt:  N/A   Date of Onset:  Onset Date: 24     Allergies:   Patient has no known allergies.  Restrictions/Precautions:   None      Interventions Planned (Treatment may consist of any combination of the following):     See Assessment Note    Subjective Comments: Pt reports that overall he continues to improve but his R hip flexor and B lateral hips continues to bother him at times after doing a lot of activity.    Initial Pain Level:     2/10  Post Session Pain Level:       1/10  Medications Last Reviewed:  2024  Updated Objective Findings:  None Today  Treatment   THERAPEUTIC EXERCISE: (35 minutes):    Exercises per grid below to improve mobility and strength.  Required moderate visual, verbal, manual, and tactile cues to promote proper body alignment, promote proper body posture, promote proper body mechanics, and promote proper body breathing techniques.  Progressed resistance, range, repetitions, and complexity of movement as indicated.   Date:  24     Activity/Exercise Parameters

## 2024-12-13 ENCOUNTER — HOSPITAL ENCOUNTER (OUTPATIENT)
Dept: PHYSICAL THERAPY | Age: 64
Setting detail: RECURRING SERIES
Discharge: HOME | End: 2024-12-16
Attending: FAMILY MEDICINE
Payer: MEDICARE

## 2024-12-13 PROCEDURE — 97110 THERAPEUTIC EXERCISES: CPT

## 2024-12-13 PROCEDURE — 97140 MANUAL THERAPY 1/> REGIONS: CPT

## 2024-12-13 ASSESSMENT — PAIN SCALES - GENERAL: PAINLEVEL_OUTOF10: 1

## 2024-12-13 NOTE — PROGRESS NOTES
Oneil BUSCH Randa  : 1960  Primary: Medicare Part A And B (Medicare)  Secondary: [employer] HE[employer] C[employer] MEDICARE SUPP [facility] @ [place] [address] [place] [place] [address]  Phone: [phone]  Fax: [phone] Plan Frequency: 2x/wk for 90 days    Plan of Care/Certification Expiration Date: 25        Plan of Care/Certification Expiration Date:  Plan of Care/Certification Expiration Date: 25    Frequency/Duration:   Plan Frequency: 2x/wk for 90 days      Time In/Out:   Time In: 1245  Time Out: 1329      PT Visit Info:    Progress Note Counter: 7      Visit Count:  7    OUTPATIENT PHYSICAL THERAPY:   Treatment Note 2024       Episode  (R hip pain )               Treatment Diagnosis:    Pain in right hip  Pain in right thigh  Medical/Referring Diagnosis:    Right leg pain    Referring Physician:  Radha Murguia MD MD Orders:  PT Eval and Treat   Return MD Appt:  N/A   Date of Onset:  Onset Date: 24     Allergies:   Patient has no known allergies.  Restrictions/Precautions:   None      Interventions Planned (Treatment may consist of any combination of the following):     See Assessment Note    Subjective Comments: Pt reports that he is doing well and has been doing the hip abd/add machines at the gym which is going well. Still has off and on pain and tightness in the R hip flexor.    Initial Pain Level:     1/10  Post Session Pain Level:       1/10  Medications Last Reviewed:  2024  Updated Objective Findings:  None Today  Treatment   THERAPEUTIC EXERCISE: (34 minutes):    Exercises per grid below to improve mobility and strength.  Required moderate visual, verbal, manual, and tactile cues to promote proper body alignment, promote proper body posture, promote proper body mechanics, and promote proper body breathing techniques.  Progressed resistance, range, repetitions, and complexity of movement as indicated.   Date:  24

## 2024-12-17 ENCOUNTER — TELEPHONE (OUTPATIENT)
Dept: FAMILY MEDICINE CLINIC | Facility: CLINIC | Age: 64
End: 2024-12-17

## 2024-12-17 ENCOUNTER — HOSPITAL ENCOUNTER (OUTPATIENT)
Dept: PHYSICAL THERAPY | Age: 64
Setting detail: RECURRING SERIES
Discharge: HOME | End: 2024-12-20
Attending: FAMILY MEDICINE
Payer: MEDICARE

## 2024-12-17 PROCEDURE — 97110 THERAPEUTIC EXERCISES: CPT

## 2024-12-17 PROCEDURE — 97140 MANUAL THERAPY 1/> REGIONS: CPT

## 2024-12-17 RX ORDER — PRAVASTATIN SODIUM 20 MG
20 TABLET ORAL EVERY EVENING
Qty: 90 TABLET | Refills: 1 | Status: SHIPPED | OUTPATIENT
Start: 2024-12-17

## 2024-12-17 ASSESSMENT — PAIN SCALES - GENERAL: PAINLEVEL_OUTOF10: 0

## 2024-12-17 NOTE — PROGRESS NOTES
Oneil Scottmore  : 1960  Primary: Medicare Part A And B (Medicare)  Secondary: [employer] HE[employer] C[employer] MEDICARE SUPP [facility] @ [place] [address] [place] [address]  Phone: [phone]  Fax: [phone] Plan Frequency: 2x/wk for 90 days    Plan of Care/Certification Expiration Date: 25        Plan of Care/Certification Expiration Date:  Plan of Care/Certification Expiration Date: 25    Frequency/Duration:   Plan Frequency: 2x/wk for 90 days      Time In/Out:   Time In: 0815  Time Out: 0859      PT Visit Info:    Progress Note Counter: 8      Visit Count:  8    OUTPATIENT PHYSICAL THERAPY:   Treatment Note 2024       Episode  (R hip pain )               Treatment Diagnosis:    Pain in right hip  Pain in right thigh  Medical/Referring Diagnosis:    Right leg pain    Referring Physician:  Radha Murguia MD MD Orders:  PT Eval and Treat   Return MD Appt:  N/A   Date of Onset:  Onset Date: 24     Allergies:   Patient has no known allergies.  Restrictions/Precautions:   None      Interventions Planned (Treatment may consist of any combination of the following):     See Assessment Note    Subjective Comments: Pt reports he has had a breakthrough. He was walking on the treadmill focusing on keeping his toes pointed straight ahead and all of a sudden he felt his R hip flexor release and he has not had anymore pain there.    Initial Pain Level:     0/10  Post Session Pain Level:       0/10  Medications Last Reviewed:  2024  Updated Objective Findings:  None Today  Treatment   THERAPEUTIC EXERCISE: (34 minutes):    Exercises per grid below to improve mobility and strength.  Required moderate visual, verbal, manual, and tactile cues to promote proper body alignment, promote proper body posture, promote proper body mechanics, and promote proper body breathing techniques.  Progressed resistance, range, repetitions, and complexity of movement as

## 2024-12-17 NOTE — TELEPHONE ENCOUNTER
Refill: Pravastatin  Dosage: 20 MG   Freq: qd   To: [address], [place]     Pt called and said he is out of refills and asked if he should be seen for a refill?

## 2024-12-20 ENCOUNTER — HOSPITAL ENCOUNTER (OUTPATIENT)
Dept: PHYSICAL THERAPY | Age: 64
Setting detail: RECURRING SERIES
Discharge: HOME | End: 2024-12-23
Attending: FAMILY MEDICINE
Payer: MEDICARE

## 2024-12-20 PROCEDURE — 97140 MANUAL THERAPY 1/> REGIONS: CPT

## 2024-12-20 PROCEDURE — 97110 THERAPEUTIC EXERCISES: CPT

## 2024-12-20 RX ORDER — PRAVASTATIN SODIUM 20 MG
20 TABLET ORAL EVERY EVENING
Qty: 90 TABLET | Refills: 1 | OUTPATIENT
Start: 2024-12-20

## 2024-12-20 ASSESSMENT — PAIN SCALES - GENERAL: PAINLEVEL_OUTOF10: 0

## 2024-12-20 NOTE — PROGRESS NOTES
Oneil Tolentino  : 1960  Primary: Medicare Part A And B (Medicare)  Secondary: [employer] MEDICARE SUPP [facility] @ [place] [address] [place] [address]  Phone: [phone]  Fax: [phone] Plan Frequency: 2x/wk for 90 days    Plan of Care/Certification Expiration Date: 25        Plan of Care/Certification Expiration Date:  Plan of Care/Certification Expiration Date: 25    Frequency/Duration:   Plan Frequency: 2x/wk for 90 days      Time In/Out:   Time In: 0945  Time Out: 1042      PT Visit Info:    Progress Note Counter: 9      Visit Count:  9    OUTPATIENT PHYSICAL THERAPY:   Treatment Note 2024       Episode  (R hip pain )               Treatment Diagnosis:    Pain in right hip  Pain in right thigh  Medical/Referring Diagnosis:    Right leg pain    Referring Physician:  Radha Murguia MD MD Orders:  PT Eval and Treat   Return MD Appt:  N/A   Date of Onset:  Onset Date: 24     Allergies:   Patient has no known allergies.  Restrictions/Precautions:   None      Interventions Planned (Treatment may consist of any combination of the following):     See Assessment Note    Subjective Comments: Pt reports he continues to improve. No pain but does notice tightness in the hip flexor.    Initial Pain Level:     0/10  Post Session Pain Level:       0/10  Medications Last Reviewed:  2024  Updated Objective Findings:  None Today  Treatment   THERAPEUTIC EXERCISE: (47 minutes):    Exercises per grid below to improve mobility and strength.  Required moderate visual, verbal, manual, and tactile cues to promote proper body alignment, promote proper body posture, promote proper body mechanics, and promote proper body breathing techniques.  Progressed resistance, range, repetitions, and complexity of movement as indicated.   Date:  24     Activity/Exercise Parameters   Prone quad stretch 4 minutes   Pal stretch 5 minutes   JEFF

## 2024-12-31 ENCOUNTER — HOSPITAL ENCOUNTER (OUTPATIENT)
Dept: PHYSICAL THERAPY | Age: 64
Setting detail: RECURRING SERIES
Discharge: HOME | End: 2025-01-03
Attending: FAMILY MEDICINE
Payer: MEDICARE

## 2024-12-31 PROCEDURE — 97110 THERAPEUTIC EXERCISES: CPT

## 2024-12-31 PROCEDURE — 97140 MANUAL THERAPY 1/> REGIONS: CPT

## 2024-12-31 ASSESSMENT — PAIN SCALES - GENERAL: PAINLEVEL_OUTOF10: 0

## 2024-12-31 NOTE — PROGRESS NOTES
Oneil Tolentino  : 1960  Primary: Medicare Part A And B (Medicare)  Secondary: [employer] MEDICARE SUPP [facility] [place] [address]  Phone: [phone]  Fax: [phone] Plan Frequency: 2x/wk for 90 days  Plan of Care/Certification Expiration Date: 25        Plan of Care/Certification Expiration Date:  Plan of Care/Certification Expiration Date: 25    Frequency/Duration: Plan Frequency: 2x/wk for 90 days      Time In/Out:   Time In: 0815  Time Out: 0859      PT Visit Info:    Progress Note Counter: 10      Visit Count:  10                OUTPATIENT PHYSICAL THERAPY:             Progress Report 2024               Episode (R hip pain )         Treatment Diagnosis:     Pain in right hip  Pain in right thigh  Medical/Referring Diagnosis:    Right leg pain    Referring Physician:  Radha Murguia MD MD Orders:  PT Eval and Treat   Return MD Appt:  N/A  Date of Onset:  Onset Date: 24     Allergies:  Patient has no known allergies.  Restrictions/Precautions:    None      Medications Last Reviewed:  2024     SUBJECTIVE   History of Injury/Illness (Reason for Referral):  Pt reports hx of R hip pain that started in the buttock area and radiated down his lateral hip and thigh. His MD thought bursitis and gave him an injection which only helped a few days. He has a massage gun which has helped it. This pain is worse at night if he sleeps on his R side. The past 2-3 months this pain has improved some but now he has increased R anterior hip and quad pain with occasional \"catching\" that is worse with stairs, and walking on uneven terrain. Pain is described as an ache or sharp and he denies burning, numbness or tingling. He denies LBP or hx of sciatic pain. He is currently unable to do as much weight at the gym or walk as long on the treadmill. He has a bad R knee with Osgood Schlotters diagnosed in childhood which

## 2024-12-31 NOTE — PROGRESS NOTES
Oneil Tolentino  : 1960  Primary: Medicare Part A And B (Medicare)  Secondary: [employer] MEDICARE SUPP [facility] @ [place]  [place] [address]  Phone: [phone]  Fax: [phone] Plan Frequency: 2x/wk for 90 days    Plan of Care/Certification Expiration Date: 25        Plan of Care/Certification Expiration Date:  Plan of Care/Certification Expiration Date: 25    Frequency/Duration:   Plan Frequency: 2x/wk for 90 days      Time In/Out:   Time In: 0815  Time Out: 0859      PT Visit Info:    Progress Note Counter: 10      Visit Count:  10    OUTPATIENT PHYSICAL THERAPY:   Treatment Note 2024       Episode  (R hip pain )               Treatment Diagnosis:    Pain in right hip  Pain in right thigh  Medical/Referring Diagnosis:    Right leg pain    Referring Physician:  Radha Murguia MD MD Orders:  PT Eval and Treat   Return MD Appt:  N/A   Date of Onset:  Onset Date: 24     Allergies:   Patient has no known allergies.  Restrictions/Precautions:   None      Interventions Planned (Treatment may consist of any combination of the following):     See Assessment Note    Subjective Comments: Pt reports he got really sore after a good workout in the gym yesterday but the soreness is gone today. He did well doing his HEP over  with no major pain flare ups.    Initial Pain Level:     0/10  Post Session Pain Level:       0/10  Medications Last Reviewed:  2024  Updated Objective Findings:  None Today  Treatment   THERAPEUTIC EXERCISE: (34 minutes):    Exercises per grid below to improve mobility and strength.  Required moderate visual, verbal, manual, and tactile cues to promote proper body alignment, promote proper body posture, promote proper body mechanics, and promote proper body breathing techniques.  Progressed resistance, range, repetitions, and complexity of movement as indicated.   Date:  24

## 2025-01-03 ENCOUNTER — HOSPITAL ENCOUNTER (OUTPATIENT)
Dept: PHYSICAL THERAPY | Age: 65
Setting detail: RECURRING SERIES
Discharge: HOME | End: 2025-01-06
Attending: FAMILY MEDICINE
Payer: MEDICARE

## 2025-01-03 PROCEDURE — 97140 MANUAL THERAPY 1/> REGIONS: CPT

## 2025-01-03 PROCEDURE — 97110 THERAPEUTIC EXERCISES: CPT

## 2025-01-03 ASSESSMENT — PAIN SCALES - GENERAL: PAINLEVEL_OUTOF10: 0

## 2025-01-03 NOTE — PROGRESS NOTES
Oneil BUSCH Randa  : 1960  Primary: Medicare Part A And B (Medicare)  Secondary: [employer] MEDICARE SUPP [facility] @ [place] [address] [place] [address]  Phone: [phone]  Fax: [phone] Plan Frequency: 2x/wk for 90 days    Plan of Care/Certification Expiration Date: 25        Plan of Care/Certification Expiration Date:  Plan of Care/Certification Expiration Date: 25    Frequency/Duration:   Plan Frequency: 2x/wk for 90 days      Time In/Out:   Time In: 0815  Time Out: 0859      PT Visit Info:    Progress Note Counter: 1      Visit Count:  11    OUTPATIENT PHYSICAL THERAPY:   Treatment Note 1/3/2025       Episode  (R hip pain )               Treatment Diagnosis:    Pain in right hip  Pain in right thigh  Medical/Referring Diagnosis:    Right leg pain    Referring Physician:  Radha Murguia MD MD Orders:  PT Eval and Treat   Return MD Appt:  N/A   Date of Onset:  Onset Date: 24     Allergies:   Patient has no known allergies.  Restrictions/Precautions:   None      Interventions Planned (Treatment may consist of any combination of the following):     See Assessment Note    Subjective Comments: Pt reports he is using heat on his hip flexors which is helping him get up from a chair easier.    Initial Pain Level:     0/10  Post Session Pain Level:       0/10  Medications Last Reviewed:  1/3/2025  Updated Objective Findings:  None Today  Treatment   THERAPEUTIC EXERCISE: (34 minutes):    Exercises per grid below to improve mobility and strength.  Required moderate visual, verbal, manual, and tactile cues to promote proper body alignment, promote proper body posture, promote proper body mechanics, and promote proper body breathing techniques.  Progressed resistance, range, repetitions, and complexity of movement as indicated.   Date:  25     Activity/Exercise Parameters   Prone quad stretch 4 minutes   Pal stretch 5 minutes

## 2025-01-07 ENCOUNTER — HOSPITAL ENCOUNTER (OUTPATIENT)
Dept: PHYSICAL THERAPY | Age: 65
Setting detail: RECURRING SERIES
Discharge: HOME | End: 2025-01-10
Attending: FAMILY MEDICINE
Payer: MEDICARE

## 2025-01-07 PROCEDURE — 97110 THERAPEUTIC EXERCISES: CPT

## 2025-01-07 PROCEDURE — 97140 MANUAL THERAPY 1/> REGIONS: CPT

## 2025-01-07 ASSESSMENT — PAIN SCALES - GENERAL: PAINLEVEL_OUTOF10: 0

## 2025-01-07 NOTE — PROGRESS NOTES
Oneil Tolentino  : 1960  Primary: Medicare Part A And B (Medicare)  Secondary: [employer] HE[employer] MEDICARE SUPP [facility] @ [place] [address] [place] [address]  Phone: [phone]  Fax: [phone] Plan Frequency: 2x/wk for 90 days    Plan of Care/Certification Expiration Date: 25        Plan of Care/Certification Expiration Date:  Plan of Care/Certification Expiration Date: 25    Frequency/Duration:   Plan Frequency: 2x/wk for 90 days      Time In/Out:   Time In: 1245  Time Out: 1329      PT Visit Info:    Progress Note Counter: 1      Visit Count:  12    OUTPATIENT PHYSICAL THERAPY:   Treatment Note 2025       Episode  (R hip pain )               Treatment Diagnosis:    Pain in right hip  Pain in right thigh  Medical/Referring Diagnosis:    Right leg pain    Referring Physician:  Radha Murguia MD MD Orders:  PT Eval and Treat   Return MD Appt:  N/A   Date of Onset:  Onset Date: 24     Allergies:   Patient has no known allergies.  Restrictions/Precautions:   None      Interventions Planned (Treatment may consist of any combination of the following):     See Assessment Note    Subjective Comments: Pt reports he is making progress.    Initial Pain Level:     0/10  Post Session Pain Level:       0/10  Medications Last Reviewed:  2025  Updated Objective Findings:  None Today  Treatment   THERAPEUTIC EXERCISE: (34 minutes):    Exercises per grid below to improve mobility and strength.  Required moderate visual, verbal, manual, and tactile cues to promote proper body alignment, promote proper body posture, promote proper body mechanics, and promote proper body breathing techniques.  Progressed resistance, range, repetitions, and complexity of movement as indicated.   Date:  25     Activity/Exercise Parameters   Prone quad stretch 4 minutes   Pal stretch 5 minutes   JEFF stretch    Prone on elbows 1 min   Calf stretch 1 min

## 2025-01-15 ENCOUNTER — HOSPITAL ENCOUNTER (OUTPATIENT)
Dept: PHYSICAL THERAPY | Age: 65
Setting detail: RECURRING SERIES
Discharge: HOME | End: 2025-01-18
Attending: FAMILY MEDICINE
Payer: MEDICARE

## 2025-01-15 PROCEDURE — 97140 MANUAL THERAPY 1/> REGIONS: CPT

## 2025-01-15 PROCEDURE — 97110 THERAPEUTIC EXERCISES: CPT

## 2025-01-15 ASSESSMENT — PAIN SCALES - GENERAL: PAINLEVEL_OUTOF10: 0

## 2025-01-15 NOTE — PROGRESS NOTES
Oneil BUSCH Randa  : 1960  Primary: Medicare Part A And B (Medicare)  Secondary: [employer] MEDICARE SUPP [facility] @ [place] [address] [place] [address]  Phone: [phone]  Fax: [phone] Plan Frequency: 2x/wk for 90 days    Plan of Care/Certification Expiration Date: 25        Plan of Care/Certification Expiration Date:  Plan of Care/Certification Expiration Date: 25    Frequency/Duration:   Plan Frequency: 2x/wk for 90 days      Time In/Out:   Time In: 1630  Time Out: 1715      PT Visit Info:    Progress Note Counter: 3      Visit Count:  13    OUTPATIENT PHYSICAL THERAPY:   Treatment Note 1/15/2025       Episode  (R hip pain )               Treatment Diagnosis:    Pain in right hip  Pain in right thigh  Medical/Referring Diagnosis:    Right leg pain    Referring Physician:  Radha Murguia MD MD Orders:  PT Eval and Treat   Return MD Appt:  N/A   Date of Onset:  Onset Date: 24     Allergies:   Patient has no known allergies.  Restrictions/Precautions:   None      Interventions Planned (Treatment may consist of any combination of the following):     See Assessment Note    Subjective Comments: Pt reports he makes his hip sore in the gym but the soreness goes away after. Overall doing well, but doesn't feel like he gets the good stretch at home like he does here.    Initial Pain Level:     0/10  Post Session Pain Level:       0/10  Medications Last Reviewed:  1/15/2025  Updated Objective Findings:  None Today  Treatment   THERAPEUTIC EXERCISE: (35 minutes):    Exercises per grid below to improve mobility and strength.  Required moderate visual, verbal, manual, and tactile cues to promote proper body alignment, promote proper body posture, promote proper body mechanics, and promote proper body breathing techniques.  Progressed resistance, range, repetitions, and complexity of movement as indicated.   Date:  01/15/25

## 2025-01-23 ENCOUNTER — HOSPITAL ENCOUNTER (OUTPATIENT)
Dept: PHYSICAL THERAPY | Age: 65
Setting detail: RECURRING SERIES
Discharge: HOME | End: 2025-01-26
Attending: FAMILY MEDICINE
Payer: MEDICARE

## 2025-01-23 PROCEDURE — 97110 THERAPEUTIC EXERCISES: CPT

## 2025-01-23 PROCEDURE — 97140 MANUAL THERAPY 1/> REGIONS: CPT

## 2025-01-23 ASSESSMENT — PAIN SCALES - GENERAL: PAINLEVEL_OUTOF10: 0

## 2025-01-23 NOTE — PROGRESS NOTES
Oneil BUSCH Randa  : 1960  Primary: Medicare Part A And B (Medicare)  Secondary: [employer] HE[employer] C[employer] MEDICARE SUPP [facility] @ [place] [address] [place] [place] [address]  Phone: [phone]  Fax: [phone] Plan Frequency: 2x/wk for 90 days    Plan of Care/Certification Expiration Date: 25        Plan of Care/Certification Expiration Date:  Plan of Care/Certification Expiration Date: 25    Frequency/Duration:   Plan Frequency: 2x/wk for 90 days      Time In/Out:   Time In: 1200  Time Out: 1244      PT Visit Info:    Progress Note Counter: 4      Visit Count:  14    OUTPATIENT PHYSICAL THERAPY:   Treatment Note 2025       Episode  (R hip pain )               Treatment Diagnosis:    Pain in right hip  Pain in right thigh  Medical/Referring Diagnosis:    Right leg pain    Referring Physician:  Radha Murguia MD MD Orders:  PT Eval and Treat   Return MD Appt:  N/A   Date of Onset:  Onset Date: 24     Allergies:   Patient has no known allergies.  Restrictions/Precautions:   None      Interventions Planned (Treatment may consist of any combination of the following):     See Assessment Note    Subjective Comments: Pt reports he sometimes gets a twinge in his R hip flexor when pivoting or twisting but overall is doing well.     Initial Pain Level:     0/10  Post Session Pain Level:       0/10  Medications Last Reviewed:  2025  Updated Objective Findings:  None Today  Treatment   THERAPEUTIC EXERCISE: (35 minutes):    Exercises per grid below to improve mobility and strength.  Required moderate visual, verbal, manual, and tactile cues to promote proper body alignment, promote proper body posture, promote proper body mechanics, and promote proper body breathing techniques.  Progressed resistance, range, repetitions, and complexity of movement as indicated.   Date:  25     Activity/Exercise Parameters   Prone quad stretch 4 minutes   Pal

## 2025-01-29 ENCOUNTER — HOSPITAL ENCOUNTER (OUTPATIENT)
Dept: PHYSICAL THERAPY | Age: 65
Setting detail: RECURRING SERIES
Discharge: HOME | End: 2025-02-01
Attending: FAMILY MEDICINE
Payer: MEDICARE

## 2025-01-29 PROCEDURE — 97110 THERAPEUTIC EXERCISES: CPT

## 2025-01-29 PROCEDURE — 97140 MANUAL THERAPY 1/> REGIONS: CPT

## 2025-01-29 ASSESSMENT — PAIN SCALES - GENERAL: PAINLEVEL_OUTOF10: 0

## 2025-01-29 NOTE — PROGRESS NOTES
Oneil Tolentino  : 1960  Primary: Medicare Part A And B (Medicare)  Secondary: [employer] HE[employer] C[employer] MEDICARE SUPP [facility] @ [place] [address] [place] [address]  Phone: [phone]  Fax: [phone] Plan Frequency: 2x/wk for 90 days    Plan of Care/Certification Expiration Date: 25        Plan of Care/Certification Expiration Date:  Plan of Care/Certification Expiration Date: 25    Frequency/Duration:   Plan Frequency: 2x/wk for 90 days      Time In/Out:   Time In: 1630  Time Out: 1715      PT Visit Info:    Progress Note Counter: 5      Visit Count:  15    OUTPATIENT PHYSICAL THERAPY:   Treatment Note 2025       Episode  (R hip pain )               Treatment Diagnosis:    Pain in right hip  Pain in right thigh  Medical/Referring Diagnosis:    Right leg pain    Referring Physician:  Radha Murguia MD MD Orders:  PT Eval and Treat   Return MD Appt:  N/A   Date of Onset:  Onset Date: 24     Allergies:   Patient has no known allergies.  Restrictions/Precautions:   None      Interventions Planned (Treatment may consist of any combination of the following):     See Assessment Note    Subjective Comments: Pt d/c    Initial Pain Level:     0/10  Post Session Pain Level:       0/10  Medications Last Reviewed:  2025  Updated Objective Findings:  None Today  Treatment   THERAPEUTIC EXERCISE: (34 minutes):    Exercises per grid below to improve mobility and strength.  Required moderate visual, verbal, manual, and tactile cues to promote proper body alignment, promote proper body posture, promote proper body mechanics, and promote proper body breathing techniques.  Progressed resistance, range, repetitions, and complexity of movement as indicated.   Date:  25     Activity/Exercise Parameters   Prone quad stretch 4 minutes   Pal stretch 5 minutes   JEFF stretch 1 min ea   Prone on elbows    Calf stretch 1 min   Piriformis stretch 30 sec

## 2025-01-29 NOTE — THERAPY DISCHARGE
Oneil NARAYAN Randa  : 1960  Primary: Medicare Part A And B (Medicare)  Secondary: [employer] HE[employer] C[employer] MEDICARE SUPP [facility] @ [place] [address] [place] [address]  Phone: [phone]  Fax: [phone] Plan Frequency: 2x/wk for 90 days  Plan of Care/Certification Expiration Date: 25        Plan of Care/Certification Expiration Date:  Plan of Care/Certification Expiration Date: 25    Frequency/Duration: Plan Frequency: 2x/wk for 90 days      Time In/Out:   Time In: 1630  Time Out: 1715      PT Visit Info:    Progress Note Counter: 5      Visit Count:  15                OUTPATIENT PHYSICAL THERAPY:             Discharge Summary 2025               Episode (R hip pain )         Treatment Diagnosis:     Pain in right hip  Pain in right thigh  Medical/Referring Diagnosis:    Right leg pain    Referring Physician:  Radha Murguia MD MD Orders:  PT Eval and Treat   Return MD Appt:  N/A  Date of Onset:  Onset Date: 24     Allergies:  Patient has no known allergies.  Restrictions/Precautions:    None      Medications Last Reviewed:  2025     SUBJECTIVE   Pt is doing well and only gets sore after he works out really hard.       OBJECTIVE     LOWER EXTREMITY      Left  Right    Knee flexion  Knee extension  Hip Ext  Hip Flexion  Hip ER  Hip IR   Hip ABD-  5/5  5/5  5/5  5/5  5/5  5/5  5/5 Knee flexion   Knee extension  Hip Ext  Hip Flexion  Hip ER  Hip IR  Hip ABD   5/5  5/5    5/5  5/5  5/5  5/5  5/5     No pain in R anterior hip and quad with all hip MMT    Gait: WNL  SLS: 10 sec ea side         Outcome Measure:   Tool Used: Lower Extremity Functional Scale (LEFS)  Score:  Initial:3280 Most Recent: 5880 (Date: 24 )   Interpretation of Score: 20 questions each scored on a 5 point scale with 0 representing \"extreme difficulty or unable to perform\" and 4 representing \"no difficulty\".  The lower the score, the greater the functional

## (undated) DEVICE — KENDALL RADIOLUCENT FOAM MONITORING ELECTRODE RECTANGULAR SHAPE: Brand: KENDALL

## (undated) DEVICE — SYR 3ML LL TIP 1/10ML GRAD --

## (undated) DEVICE — CONNECTOR TBNG OD5-7MM O2 END DISP

## (undated) DEVICE — NDL PRT INJ NSAF BLNT 18GX1.5 --

## (undated) DEVICE — SYR 5ML 1/5 GRAD LL NSAF LF --

## (undated) DEVICE — CANNULA NSL ORAL AD FOR CAPNOFLEX CO2 O2 AIRLFE